# Patient Record
Sex: FEMALE | Race: WHITE | NOT HISPANIC OR LATINO | Employment: FULL TIME | URBAN - METROPOLITAN AREA
[De-identification: names, ages, dates, MRNs, and addresses within clinical notes are randomized per-mention and may not be internally consistent; named-entity substitution may affect disease eponyms.]

---

## 2017-03-28 ENCOUNTER — ALLSCRIPTS OFFICE VISIT (OUTPATIENT)
Dept: OTHER | Facility: OTHER | Age: 27
End: 2017-03-28

## 2017-03-28 DIAGNOSIS — S91.339A: ICD-10-CM

## 2017-08-15 ENCOUNTER — ALLSCRIPTS OFFICE VISIT (OUTPATIENT)
Dept: OTHER | Facility: OTHER | Age: 27
End: 2017-08-15

## 2017-10-02 ENCOUNTER — GENERIC CONVERSION - ENCOUNTER (OUTPATIENT)
Dept: OTHER | Facility: OTHER | Age: 27
End: 2017-10-02

## 2017-10-20 ENCOUNTER — GENERIC CONVERSION - ENCOUNTER (OUTPATIENT)
Dept: OTHER | Facility: OTHER | Age: 27
End: 2017-10-20

## 2017-10-23 NOTE — PROGRESS NOTES
Assessment  1  Encounter for gynecological examination without abnormal finding (V72 31) (Z01 419)    Plan  Encounter for gynecological examination without abnormal finding    · (1) THIN PREP PAP WITH IMAGING; Status: In Progress - Specimen/Data Collected;    Done: 90SVT1397   Perform:[facility]; Due:24Wkr3642; Ordered;For:Encounter for gynecological examination without abnormal finding; Ordered By:Moise Lennon; Maturation index required? : No  HPV? : if ASCUS  History of PCOS    · Alyacen 1/35 1-35 MG-MCG Oral Tablet; Take 1 tablet daily   Rx By: Daxa Alejandro; Dispense: 84 Days ; #:84 Tablet; Refill: 3;For: History of PCOS; PAWAN = N; Verified Transmission [facility]; Msg to Pharmacy: takes continuously skipping placebo week; Last Updated By: System, [employer]; 8/15/2017 11:53:07 AM    Discussion/Summary  health maintenance visit healthy adult female the risks and benefits of cervical cancer screening were discussed Pap test with reflex HPV testing was done today Breast cancer screening: the risks and benefits of breast cancer screening were discussed and monthly self breast exam was advised  Pt for annual GYN doing well on continuous pills  F/u 1 year, earlier as needed  Pt is currently in process of applying for optometry school  The patient has the current Goals: At goal  The patent has the current Barriers: None  Patient is able to Self-Care  Chief Complaint  Pt for annual GYN exam      History of Present Illness  HPI: Pt for annual GYN exam  Periods non existent takes pills continuously  Denies vaginal d/c or GYN complaints  w current partner x 6 5 years, declines STD work up  Feels much better emotionally after Nexplanon was removed  GYN HM, Adult Female [facility]: The patient is being seen for a health maintenance and gynecology evaluation  The last health maintenance visit was 1 year(s) ago  General Health:  The patient's health since the last visit is described as good  Lifestyle:  She does not use tobacco --Kelly denies alcohol use  --Kelly denies drug use  Reproductive health:  she uses contraception  For contraception, she uses oral contraception pills  --kelly is sexually active  --kelly denies prior pregnancies  Screening:      Review of Systems  oligomenorrhea, but--b0no pelvic pain,--b0no pelvic pressure,--b0no vaginal pain,--b0no vaginal discharge,--b0no vaginal itching,--b0no vaginal lump or mass,--b0no vaginal odor,--b0no nonmenstrual bleeding,--b0no dysmenorrhea,--b0no menorrhagia,--c5lmexjoo are regular,--p0cdfnuzy length of periods,--b0no dysuria,--b0no bladder pain,--l7ronfa not cloudyno urinary loss of control  Constitutional: no feverno chills  Cardiovascular: no chest painno palpitations  Breasts: no breast painno breast lump  Gastrointestinal: no abdominal painno constipation  Genitourinary: no dysuria  Active Problems  1  Acute sinusitis (461 9) (J01 90)   2  Blind spot scotoma, 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)   3  Contraceptive use (V25 40) (Z30 40)   4  Encounter for gynecological examination without abnormal finding (V72 31) (Z01 419)   5  Fibromyalgia (729 1) (M79 7)   6  History of PCOS (V13 29) (Z87 42)   7  Insertion of Nexplanon (V25 5) (Z30 017)   8  Low vitamin D level (268 9) (E55 9)   9  Need for Tdap vaccination (V06 1) (Z23)   10  Nexplanon removal (V25 43) (Z30 46)   11  Oligomenorrhea (626 1) (N91 5)   12  Palpitations (785 1) (R00 2)   13  Panic attacks (300 01) (F41 0)   14  Puncture wound of foot (892 0) (S91 339A)   15  Retinopathy (362 10) (H35 00)   16  Vitamin B12 deficiency (266 2) (E53 8)    Past Medical History   · History of ovarian cyst (V13 29) (Z87 42)   · History of Menarche (V21 8)   · History of Visit for routine gyn exam (V72 31) (Z01 419)    Surgical History   · History of Gastric Surgery For Morbid Obesity Gastric Bypass    The surgical history was reviewed and updated today         Family History  Mother    · Family history of diabetes mellitus (V18 0) (Z83 3)  Maternal Grandmother    · Family history of breast cancer in female (V16 3) (Z80 3)   · Family history of diabetes mellitus (V18 0) (Z83 3)   · Family history of lung cancer (V16 1) (Z80 1)  Paternal Grandmother    · Family history of hypertension (V17 49) (Z82 49)    The family history was reviewed and updated today  Social History   · College student   · Cultural background   · NON-   · Never a smoker   · History of Never a smoker   · No alcohol use   · No drug use   · Oral contraceptives use   · Primary spoken language English   · Racial background   · WHITE   · Single  The social history was reviewed and updated today  The social history was reviewed and is unchanged  Current Meds   1  ALPRAZolam 0 5 MG Oral Tablet; take 1 tablet 3 times a day as needed for anxiety; Therapy: 42VKG2861 to (Evaluate:68Plp4762)  Requested for: 11Aug2017; Last   Rx:11Aug2017 Ordered   2  Alyacen 1/35 1-35 MG-MCG Oral Tablet; Take 1 tablet daily; Therapy: 68LRS4411 to (Chapincito Plok)  Requested for: 10Aug2017; Last   Rx:19Roi1425 Ordered   3  Amitriptyline HCl - 50 MG Oral Tablet; TAKE 1 TABLET AT BEDTIME  Requested for:   09Nwl7679; Last Rx:54Qdr2092 Ordered   4  Atenolol 50 MG Oral Tablet; take 1 tablet by mouth every day; Therapy: 07Nmh1233 to [phone])  Requested for: 01XYV5057; Last   Rx:28Jun2017 Ordered   5  Cyanocobalamin 1000 MCG/ML Injection Solution; INJECT 1 ML INTRAMUSCULARLY   ONCE A MONTH;   Therapy: 01BON0829 to (Evaluate:92Yei5541)  Requested for: 81Rmp0028; Last   Rx:38Goy4182 Ordered   6  TraMADol HCl - 50 MG Oral Tablet; Therapy: (Recorded:68Pfa4695) to Recorded   7  Vitamin D3 2000 UNIT Oral Capsule; take 1 capsule daily; Therapy: 70Wrg8357 to (Last Rx:19Apr2016) Ordered    Allergies  1  Darvocet A500 TABS   2   Sulfa Drugs    Vitals   Recorded: 87LIF9314 75:11XA   Systolic 755   Diastolic 80 Height 5 ft 9 in   Weight 160 lb    BMI Calculated 23 63   BSA Calculated 1 88     Physical Exam    Constitutional   General appearance: No acute distress, well appearing and well nourished  Neck   Neck: Normal, supple, trachea midline, no masses  Thyroid: Normal, no thyromegaly  Pulmonary   Respiratory effort: No increased work of breathing or signs of respiratory distress  Auscultation of lungs: Clear to auscultation  Cardiovascular   Auscultation of heart: Normal rate and rhythm, normal S1 and S2, no murmurs  Peripheral vascular exam: Normal pulses Throughout  Genitourinary   External genitalia: Normal and no lesions appreciated  Vagina: Normal, no lesions or dryness appreciated  Urethra: Normal     Urethral meatus: Normal     Bladder: Normal, soft, non-tender and no prolapse or masses appreciated  Cervix: Normal, no palpable masses  Uterus: Normal, non-tender, not enlarged, and no palpable masses  Adnexa/parametria: Normal, non-tender and no fullness or masses appreciated  Anus, perineum, and rectum: Normal sphincter tone, no masses, and no prolapse  Chest   Breasts: Normal and no dimpling or skin changes noted  Abdomen   Abdomen: Normal, non-tender, and no organomegaly noted  Liver and spleen: No hepatomegaly or splenomegaly  Examination for hernias: No hernias appreciated  Lymphatic   Palpation of lymph nodes in neck, axillae, groin and/or other locations: No lymphadenopathy or masses noted  Skin   Skin and subcutaneous tissue: Normal skin turgor and no rashes  Palpation of skin and subcutaneous tissue: Normal     Psychiatric   Orientation to person, place, and time: Normal     Mood and affect: Normal        Attending Note  Collaborating Physician Note: Collaborating Note: I vised the Advanced PractitionerI agree with the Advanced Practitioner note   I discussed the case with the Advanced Practitioner and reviewed the AP note      Signatures Electronically signed by : LEONARDA Bowman;  Aug 15 2017 12:03PM EST                       (Author)    Electronically signed by : PADMINI Tellez ; Aug 21 2017  1:46PM EST                       (Author)

## 2018-01-10 NOTE — PROGRESS NOTES
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)   3  Contraceptive use (V25 40) (Z30 40)   4  Encounter for gynecological examination without abnormal finding (V72 31) (Z01 419)   5  Fibromyalgia (729 1) (M79 7)   6  History of PCOS (V13 29) (Z87 42)   7  Low vitamin D level (268 9) (E55 9)   8  Oligomenorrhea (626 1) (N91 5)   9  Palpitations (785 1) (R00 2)   10  Panic attacks (300 01) (F41 0)   11  Retinopathy (362 10) (H35 00)   12  Vitamin B12 deficiency (266 2) (E53 8)    Current Meds   1  ALPRAZolam 0 25 MG Oral Tablet; 1-2 tabs every 4-6hours PRN : anxiety; Therapy: 16XXF2674 to (Last Rx:28Eto0503) Ordered   2  Amitriptyline HCl - 25 MG Oral Tablet; TAKE 1 TABLET DAILY AT BEDTIME; Therapy: (Recorded:05Kud7805) to Recorded   3  Atenolol 25 MG Oral Tablet; Take 1 tablet daily; Therapy: 35OMR7571 to (Evaluate:57Xwv3471); Last Rx:81Wpo3692 Ordered   4  Necon 1/35 (28) 1-35 MG-MCG Oral Tablet; Take 1 tablet daily; Therapy: 93IPY9843 to (Evaluate:54Bdw4705)  Requested for: 44AYD0405; Last   Rx:95Laf6020 Ordered   5  TraMADol HCl - 50 MG Oral Tablet; Therapy: (Recorded:26Aef7049) to Recorded    Allergies    1  Darvocet A500 TABS   2  Sulfa Drugs    Plan  Vitamin B12 deficiency    · Cyanocobalamin 1000 MCG/ML Injection Solution    Future Appointments    Date/Time Provider Specialty Site   04/19/2016 11:00 AM PADMINI Norton   Family Medicine [address] [place] [address]   03/08/2016 01:00 PM [address] [place] [address], Nurse Schedule  [facility]   03/22/2016 01:00 PM [address] [place] [address], Nurse Schedule  [address] [place] [address]   04/05/2016 01:00 PM [address] [place] [address], Nurse Schedule  [address] [place] [address]   05/03/2016 01:00 PM [address], Nurse Schedule  [facility]   05/17/2016 01:00 PM [facility] [facility], Nurse Schedule  [facility]     Signatures   Electronically signed by :  PADMINI Villalobos ; Mar  1 2016  4:06PM EST                       (Author)

## 2018-01-12 NOTE — RESULT NOTES
Message   Please call patient  Her TSH/thyroid function is normal     Verified Results  (LC) TSH Rfx on Abnormal to Free T4 57HHM0124 11:40AM Sukhwinder Garibay     Test Name Result Flag Reference   TSH 1 040 uIU/mL  0 450-4 500       Plan  Vitamin B12 deficiency    · Cyanocobalamin 1000 MCG/ML Injection Solution    Signatures   Electronically signed by :  PADMINI Babb ; Mar  1 2016  4:08PM EST                       (Author)

## 2018-01-13 VITALS
WEIGHT: 160 LBS | SYSTOLIC BLOOD PRESSURE: 116 MMHG | DIASTOLIC BLOOD PRESSURE: 80 MMHG | HEIGHT: 69 IN | BODY MASS INDEX: 23.7 KG/M2

## 2018-01-13 NOTE — PROGRESS NOTES
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)   3  Contraceptive use (V25 40) (Z30 40)   4  Encounter for gynecological examination without abnormal finding (V72 31) (Z01 419)   5  Fibromyalgia (729 1) (M79 7)   6  History of PCOS (V13 29) (Z87 42)   7  Low vitamin D level (268 9) (E55 9)   8  Oligomenorrhea (626 1) (N91 5)   9  Palpitations (785 1) (R00 2)   10  Panic attacks (300 01) (F41 0)   11  Retinopathy (362 10) (H35 00)   12  Vitamin B12 deficiency (266 2) (E53 8)    Current Meds   1  ALPRAZolam 0 25 MG Oral Tablet; 1-2 tabs every 4-6hours PRN : anxiety; Therapy: 86OKW9373 to (Last Rx:60Vji3588) Ordered   2  Amitriptyline HCl - 25 MG Oral Tablet; TAKE 1 TABLET DAILY AT BEDTIME; Therapy: (Recorded:00Cah6587) to Recorded   3  Atenolol 25 MG Oral Tablet; Take 1 tablet daily; Therapy: 74KMM6291 to (Evaluate:07Egd9657); Last Rx:35Lqm9713 Ordered   4  Necon 1/35 (28) 1-35 MG-MCG Oral Tablet; Take 1 tablet daily; Therapy: 25NTG6016 to (Evaluate:93Dqx8299)  Requested for: 08NLY6830; Last   Rx:96Vch1106 Ordered   5  TraMADol HCl - 50 MG Oral Tablet; Therapy: (Recorded:41Xdz9548) to Recorded    Allergies    1  Darvocet A500 TABS   2  Sulfa Drugs    Plan  Vitamin B12 deficiency    · Cyanocobalamin 1000 MCG/ML Injection Solution    Future Appointments    Date/Time Provider Specialty Site   04/19/2016 11:00 AM PADMINI Penn  Family Medicine [address] [place] [address]   04/05/2016 01:00 PM [address] [place] [address], Nurse Schedule  [address] [place] [address]   05/03/2016 01:00 PM [address] [place] [address], Nurse Schedule  [facility]   05/17/2016 01:00 PM [address] [place] [address], Nurse Schedule  [facility]     Signatures   Electronically signed by :  PADMINI Bermudez ; Mar 22 2016  8:03PM EST (Author)

## 2018-01-14 VITALS
HEIGHT: 69 IN | TEMPERATURE: 97.5 F | SYSTOLIC BLOOD PRESSURE: 122 MMHG | BODY MASS INDEX: 22.96 KG/M2 | HEART RATE: 82 BPM | DIASTOLIC BLOOD PRESSURE: 84 MMHG | RESPIRATION RATE: 14 BRPM | WEIGHT: 155 LBS

## 2018-01-14 NOTE — PROGRESS NOTES
Assessment    1  Puncture wound of foot (892 0) (N24 062C)    Plan  Need for Tdap vaccination    · Adacel 5-2-15 5 LF-MCG/0 5 Intramuscular Suspension  Puncture wound of foot    · Cephalexin 500 MG Oral Capsule; TAKE 1 CAPSULE 3 TIMES DAILY   · * XR FOOT 3+ VIEW LEFT; Status:Active; Requested for:28Mar2017;     Discussion/Summary    Puncture wound left foot  Patient is certain that there is no foreign body, nevertheless we will proceed with x-ray for further evaluation  We will treat with Keflex for 7 days  Advised elevation, patient will use Aleve 2 tablets daily for the next few days along with Nexium for GI protection  Adacel administered today    The patient was counseled regarding instructions for management, impressions  Possible side effects of new medications were reviewed with the patient/guardian today  The treatment plan was reviewed with the patient/guardian  The patient/guardian understands and agrees with the treatment plan      Chief Complaint  pt cut left foot on sheet metal two days ago, cleaned it w peroxide and water,      History of Present Illness  HPI: Patient presents for evaluation of pain in her left foot  stepped on sharp metal piece on March 25, while cleaning her basement  Puncture wound left foot /ball , no splinters  Patient stepped on fairly big piece of metal which was intact   no recent Td  Pain with ambulation, limping      Review of Systems    Constitutional: No fever, no chills, feels well, no tiredness, no recent weight gain or loss  Musculoskeletal: limb pain, foot problems and foot pain, but as noted in HPI  Neurological: no complaints of headache, no confusion, no numbness or tingling, no dizziness or fainting  Active Problems    1  Acute sinusitis (461 9) (J01 90)   2  Blind spot scotoma, 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)   3  Contraceptive use (V25 40) (Z30 40)   4  Encounter for gynecological examination without abnormal finding (V72 31) (Z01 419)   5   Fibromyalgia (729 1) (M79 7)   6  History of PCOS (V13 29) (Z87 42)   7  Insertion of Nexplanon (V25 5) (Z30 017)   8  Low vitamin D level (268 9) (E55 9)   9  Nexplanon removal (V25 43) (Z30 46)   10  Oligomenorrhea (626 1) (N91 5)   11  Palpitations (785 1) (R00 2)   12  Panic attacks (300 01) (F41 0)   13  Retinopathy (362 10) (H35 00)   14  Vitamin B12 deficiency (266 2) (E53 8)    Past Medical History    1  History of ovarian cyst (V13 29) (Z87 42)   2  History of Menarche (V21 8)   3  History of Visit for routine gyn exam (V72 31) (Z01 419)  Active Problems And Past Medical History Reviewed: The active problems and past medical history were reviewed and updated today  Family History  Mother    1  Family history of diabetes mellitus (V18 0) (Z83 3)  Maternal Grandmother    2  Family history of breast cancer in female (V16 3) (Z80 3)   3  Family history of diabetes mellitus (V18 0) (Z83 3)   4  Family history of lung cancer (V16 1) (Z80 1)  Paternal Grandmother    11  Family history of hypertension (V17 49) (Z82 49)  Family History Reviewed: The family history was reviewed and updated today  Social History    · College student   · Cultural background   · NON-   · Never a smoker   · History of Never a smoker   · No alcohol use   · No drug use   · Oral contraceptives use   · Primary spoken language English   · Racial background   · WHITE   · Single  The social history was reviewed and updated today  Surgical History    1  History of Gastric Surgery For Morbid Obesity Gastric Bypass  Surgical History Reviewed: The surgical history was reviewed and updated today  Current Meds   1  ALPRAZolam 0 5 MG Oral Tablet; take 1 tablet 3 times a day as needed for anxiety; Therapy: 33UQH3260 to (Evaluate:26Mar2017)  Requested for: 48OKL5309; Last   Rx:06Mar2017 Ordered   2  Alyacen 1/35 1-35 MG-MCG Oral Tablet; Take 1 tablet daily;    Therapy: 17OSD4085 to (Evaluate:10Oct2017)  Requested for: 38SMR4112; Last   Rx:08Nov2016 Ordered   3  Amitriptyline HCl - 50 MG Oral Tablet; TAKE 1 TABLET AT BEDTIME  Requested for:   83Nta4202; Last Rx:77Iey5126 Ordered   4  Atenolol 50 MG Oral Tablet; take 1 tablet by mouth every day; Therapy: 19Apr2016 to (Evaluate:15Jun2017)  Requested for: 73IRA8128; Last   Rx:02Qfq0502 Ordered   5  Cyanocobalamin 1000 MCG/ML Injection Solution; INJECT 1 ML INTRAMUSCULARLY   ONCE A MONTH;   Therapy: 02EHY3353 to (Evaluate:18Dfr5837)  Requested for: 91Kju9600; Last   Rx:89Yws1385 Ordered   6  Gabapentin 300 MG Oral Capsule; TAKE 1 CAPSULE AT BEDTIME; Therapy: 10Hyi5985 to (Last Rx:79Dbr6672) Ordered   7  TraMADol HCl - 50 MG Oral Tablet; Therapy: (Recorded:61Gzh6096) to Recorded   8  Vitamin D3 2000 UNIT Oral Capsule; take 1 capsule daily; Therapy: 19Apr2016 to (Last Rx:19Apr2016) Ordered    The medication list was reviewed and updated today  Allergies    1  Darvocet A500 TABS   2  Sulfa Drugs    Vitals   Recorded: 28Mar2017 12:28PM   Temperature 97 5 F   Heart Rate 82   Respiration 14   Systolic 292   Diastolic 84   Height 5 ft 9 in   Weight 155 lb    BMI Calculated 22 89   BSA Calculated 1 85     Physical Exam    Constitutional   General appearance: No acute distress, well appearing and well nourished  Neurologic   Cranial nerves: Cranial nerves 2-12 intact  Psychiatric   Orientation to person, place, and time: Normal     Additional Exam:  Left foot,ball : Small puncture wound, no foreign body seen, tenderness with palpation but no erythema, warmth or discharge  Mildly edematous second left toe, no generalized foot edema  Results/Data  PHQ-2 Adult Depression Screening 28Mar2017 12:38PM User, s     Test Name Result Flag Reference   PHQ-2 Adult Depression Score 0     Over the last two weeks, how often have you been bothered by any of the following problems?   Little interest or pleasure in doing things: Not at all - 0  Feeling down, depressed, or hopeless: Not at all - 0   PHQ-2 Adult Depression Screening Negative       [employer]Cal[employer] - Health Calculators 40LNV8998 12:37PM User, Ahs     Test Name Result Flag Reference   Tobacco Screening - Result Negative         Signatures   Electronically signed by :  PADMINI Bermudez ; Mar 29 2017 10:40PM EST                       (Author)

## 2018-01-15 NOTE — RESULT NOTES
Message   Please call patient  All her blood work was normal aside from decreased level of vitamin B-1 it is 60 with a cutoff of 66  Please make sure that patient is using vitamin B complex once a day over-the-counter  Level of vitamin B-12 have improved significantly  I believe we should continue injections every 3-4 weeks to maintain it  Verified Results  (1) VITAMIN B1, WHOLE BLOOD 63ZFZ3394 10:10AM Melva Castro courtesy copy of this report has been sent to  the patient, [facility]  Test Name Result Flag Reference   Vit  B1, Whole Blood 60 7 nmol/L L 66 5-200 0       Signatures   Electronically signed by :  Hudson Sacks, M D ; Jun 19 2016  9:27PM EST                       (Author)

## 2018-01-16 NOTE — PROGRESS NOTES
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)   3  Contraceptive use (V25 40) (Z30 40)   4  Encounter for gynecological examination without abnormal finding (V72 31) (Z01 419)   5  Fibromyalgia (729 1) (M79 7)   6  History of PCOS (V13 29) (Z87 42)   7  Low vitamin D level (268 9) (E55 9)   8  Oligomenorrhea (626 1) (N91 5)   9  Palpitations (785 1) (R00 2)   10  Panic attacks (300 01) (F41 0)   11  Retinopathy (362 10) (H35 00)   12  Vitamin B12 deficiency (266 2) (E53 8)    Current Meds   1  ALPRAZolam 0 25 MG Oral Tablet; 1-2 tabs every 4-6hours PRN : anxiety; Therapy: 10FMK0735 to (Last Rx:67Bli2947) Ordered   2  Amitriptyline HCl - 25 MG Oral Tablet; TAKE 1 TABLET DAILY AT BEDTIME; Therapy: (Recorded:26Yyu5460) to Recorded   3  Atenolol 25 MG Oral Tablet; Take 1 tablet daily; Therapy: 73DDS4658 to (Evaluate:97Jnz6242); Last Rx:89Ysm7636 Ordered   4  Necon 1/35 (28) 1-35 MG-MCG Oral Tablet; Take 1 tablet daily; Therapy: 65OEP9744 to (Evaluate:87Pjp6342)  Requested for: 00QKQ2093; Last   Rx:80Jff4198 Ordered   5  TraMADol HCl - 50 MG Oral Tablet; Therapy: (Recorded:49Pvb8069) to Recorded    Allergies    1  Darvocet A500 TABS   2  Sulfa Drugs    Plan  Vitamin B12 deficiency    · Cyanocobalamin 1000 MCG/ML Injection Solution    Future Appointments    Date/Time Provider Specialty Site   04/19/2016 11:00 AM PADMINI Pepper   Family Medicine [address] [place] [address]   03/01/2016 01:30 PM [address] [place] [address] [place] [facility], Nurse Schedule  [address] [place] [address]   03/08/2016 01:00 PM [address] [place] [address], Nurse Schedule  [address] [place] [address]   03/22/2016 01:00 PM [address], Nurse Schedule  [facility]   04/05/2016 01:00 PM [address], Nurse Schedule  [facility]   05/03/2016 01:00 PM [address], Nurse Schedule  [facility]   05/17/2016 01:00 PM [facility] [facility], Nurse Schedule  [facility]     Signatures   Electronically signed by :  PADMINI Eden ; Feb 23 2016  3:39PM EST                       (Author)

## 2018-01-16 NOTE — PROGRESS NOTES
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)   3  Contraceptive use (V25 40) (Z30 40)   4  Encounter for gynecological examination without abnormal finding (V72 31) (Z01 419)   5  Fibromyalgia (729 1) (M79 7)   6  History of PCOS (V13 29) (Z87 42)   7  Low vitamin D level (268 9) (E55 9)   8  Oligomenorrhea (626 1) (N91 5)   9  Palpitations (785 1) (R00 2)   10  Panic attacks (300 01) (F41 0)   11  Retinopathy (362 10) (H35 00)   12  Vitamin B12 deficiency (266 2) (E53 8)    Current Meds   1  ALPRAZolam 0 25 MG Oral Tablet; 1-2 tabs every 4-6hours PRN : anxiety; Therapy: 73DZC9685 to (Last Rx:19Apr2016) Ordered   2  Amitriptyline HCl - 25 MG Oral Tablet; TAKE 1 TABLET DAILY AT BEDTIME; Therapy: (Recorded:82Vda8955) to Recorded   3  Atenolol 25 MG Oral Tablet; take 1 tablet every day; Therapy: 10MGU2414 to (Evaluate:11Aug2016)  Requested for: 10AOM9389; Last   Rx:37Oeb2600 Ordered   4  Atenolol 50 MG Oral Tablet; TAKE 1 TABLET BY MOUTH ONCE DAILY; Therapy: 69Mna2991 to (Evaluate:35Dlo7347)  Requested for: 98Szs1967; Last   Rx:77Pyc2520 Ordered   5  Necon 1/35 (28) 1-35 MG-MCG Oral Tablet; Take 1 tablet daily; Therapy: 88MFO8433 to (Evaluate:60Hvm3266)  Requested for: 33LMW6440; Last   Rx:47Snx2172 Ordered   6  TraMADol HCl - 50 MG Oral Tablet; Therapy: (Recorded:73Wjg3067) to Recorded   7  Vitamin D3 2000 UNIT Oral Capsule; take 1 capsule daily; Therapy: 02Zrq4272 to (Last Rx:45Ktr7098) Ordered    Allergies    1  Darvocet A500 TABS   2  Sulfa Drugs    Plan  Vitamin B12 deficiency    · Cyanocobalamin 1000 MCG/ML Injection Solution    Future Appointments    Date/Time Provider Specialty Site   08/16/2016 11:00 AM Leigh Curling, M D [place] [address]   07/12/2016 01:00 PM [address], Nurse Schedule  [address] [facility] Signatures   Electronically signed by :  PADMINI Hyde ; Jun 7 2016  8:03PM EST                       (Author)

## 2018-01-17 NOTE — PROGRESS NOTES
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)   3  Contraceptive use (V25 40) (Z30 40)   4  Encounter for gynecological examination without abnormal finding (V72 31) (Z01 419)   5  Fibromyalgia (729 1) (M79 7)   6  History of PCOS (V13 29) (Z87 42)   7  Low vitamin D level (268 9) (E55 9)   8  Oligomenorrhea (626 1) (N91 5)   9  Palpitations (785 1) (R00 2)   10  Panic attacks (300 01) (F41 0)   11  Retinopathy (362 10) (H35 00)   12  Vitamin B12 deficiency (266 2) (E53 8)    Current Meds   1  ALPRAZolam 0 25 MG Oral Tablet; 1-2 tabs every 4-6hours PRN : anxiety; Therapy: 80SKB0403 to (Last Rx:58Lqn6452) Ordered   2  Amitriptyline HCl - 25 MG Oral Tablet; TAKE 1 TABLET DAILY AT BEDTIME; Therapy: (Recorded:85Ncn1900) to Recorded   3  Atenolol 25 MG Oral Tablet; Take 1 tablet daily; Therapy: 30NTI1009 to (Evaluate:82Hdx5011); Last Rx:16Vij2398 Ordered   4  Necon 1/35 (28) 1-35 MG-MCG Oral Tablet; Take 1 tablet daily; Therapy: 66WHL2639 to (Evaluate:20Plx2862)  Requested for: 19MSP7985; Last   Rx:18Mxx6465 Ordered   5  TraMADol HCl - 50 MG Oral Tablet; Therapy: (Recorded:60Obs9407) to Recorded    Allergies    1  Darvocet A500 TABS   2  Sulfa Drugs    Plan  Vitamin B12 deficiency    · Cyanocobalamin 1000 MCG/ML Injection Solution    Future Appointments    Date/Time Provider Specialty Site   04/19/2016 11:00 AM PADMINI Santizo   Family Medicine [address] [place] [address] [facility] [place]   03/22/2016 01:00 PM [address] [place] [address] [place], Nurse Schedule  [address] [facility] [place]   04/05/2016 01:00 PM [address] [place] [address] [place], Nurse Schedule  [address] [facility]   05/03/2016 01:00 PM [address], Nurse Schedule  [address] [facility]   05/17/2016 01:00 PM [address], Nurse Schedule  [address] [facility] Signatures   Electronically signed by :  PADMINI Bermudez ; Mar  8 2016  3:41PM EST                       (Author)

## 2018-01-18 NOTE — PROGRESS NOTES
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)   3  Contraceptive use (V25 40) (Z30 40)   4  Encounter for gynecological examination without abnormal finding (V72 31) (Z01 419)   5  Fibromyalgia (729 1) (M79 7)   6  History of PCOS (V13 29) (Z87 42)   7  Low vitamin D level (268 9) (E55 9)   8  Oligomenorrhea (626 1) (N91 5)   9  Palpitations (785 1) (R00 2)   10  Panic attacks (300 01) (F41 0)   11  Retinopathy (362 10) (H35 00)   12  Vitamin B12 deficiency (266 2) (E53 8)    Current Meds   1  ALPRAZolam 0 25 MG Oral Tablet; 1-2 tabs every 4-6hours PRN : anxiety; Therapy: 28IUB9456 to (Last Rx:35Obp5605) Ordered   2  Amitriptyline HCl - 25 MG Oral Tablet; TAKE 1 TABLET DAILY AT BEDTIME; Therapy: (Recorded:36Iui2034) to Recorded   3  Atenolol 50 MG Oral Tablet; TAKE 1 TABLET BY MOUTH ONCE DAILY; Therapy: 99Bak4854 to (Evaluate:83Ltw8676)  Requested for: 55Xdc0258; Last   Rx:49Wiq9876 Ordered   4  Necon 1/35 (28) 1-35 MG-MCG Oral Tablet; Take 1 tablet daily; Therapy: 60OHM0603 to (Evaluate:70Opm1400)  Requested for: 60UQR1447; Last   Rx:66Luv0411 Ordered   5  TraMADol HCl - 50 MG Oral Tablet; Therapy: (Recorded:39Lsn0014) to Recorded   6  Vitamin D3 2000 UNIT Oral Capsule; take 1 capsule daily; Therapy: 07Rhi5128 to (Last Rx:55Ovn9811) Ordered    Allergies    1  Darvocet A500 TABS   2  Sulfa Drugs    Plan  Vitamin B12 deficiency    · Cyanocobalamin 1000 MCG/ML Injection Solution    Future Appointments    Date/Time Provider Specialty Site   08/16/2016 11:00 AM PADMINI Pitt  Family Medicine [address] [place] [address]   06/07/2016 03:00 PM [address] [facility], Nurse Schedule  [address] [facility]   07/12/2016 01:00 PM [address] [facility], Nurse Schedule  [facility]     Signatures   Electronically signed by :  Arden Dangelo PADMINI Smith ; May  3 2016  8:12PM EST                       (Author)

## 2018-01-22 VITALS
SYSTOLIC BLOOD PRESSURE: 118 MMHG | RESPIRATION RATE: 16 BRPM | BODY MASS INDEX: 24.33 KG/M2 | DIASTOLIC BLOOD PRESSURE: 74 MMHG | TEMPERATURE: 96.1 F | HEIGHT: 69 IN | WEIGHT: 164.25 LBS | HEART RATE: 68 BPM

## 2018-01-26 DIAGNOSIS — F41.1 GAD (GENERALIZED ANXIETY DISORDER): Primary | ICD-10-CM

## 2018-01-26 RX ORDER — ALPRAZOLAM 0.5 MG/1
TABLET ORAL
Qty: 60 TABLET | Refills: 0 | Status: SHIPPED | OUTPATIENT
Start: 2018-01-26 | End: 2018-06-26 | Stop reason: SDUPTHER

## 2018-01-26 NOTE — TELEPHONE ENCOUNTER
Please contact patient  I am refilling her alprazolam but I have not seen her in the office since March of 2017    Please advise her to schedule checkup/physical   Thank you

## 2018-03-27 DIAGNOSIS — Z30.41 ENCOUNTER FOR SURVEILLANCE OF CONTRACEPTIVE PILLS: Primary | ICD-10-CM

## 2018-04-17 DIAGNOSIS — Z30.41 ENCOUNTER FOR SURVEILLANCE OF CONTRACEPTIVE PILLS: ICD-10-CM

## 2018-05-10 ENCOUNTER — TELEPHONE (OUTPATIENT)
Dept: OBGYN CLINIC | Facility: CLINIC | Age: 28
End: 2018-05-10

## 2018-05-10 DIAGNOSIS — Z30.41 ENCOUNTER FOR SURVEILLANCE OF CONTRACEPTIVE PILLS: ICD-10-CM

## 2018-05-10 NOTE — TELEPHONE ENCOUNTER
rx sent through to \[facility]\"" [facility] to sign off

## 2018-06-25 RX ORDER — AMITRIPTYLINE HYDROCHLORIDE 50 MG/1
1 TABLET, FILM COATED ORAL
COMMUNITY

## 2018-06-25 RX ORDER — ACETAMINOPHEN 160 MG
1 TABLET,DISINTEGRATING ORAL DAILY
COMMUNITY
Start: 2016-04-19

## 2018-06-25 RX ORDER — TRAMADOL HYDROCHLORIDE 50 MG/1
50 TABLET ORAL 4 TIMES DAILY
Refills: 3 | COMMUNITY
Start: 2018-05-29

## 2018-06-25 RX ORDER — CYANOCOBALAMIN 1000 UG/ML
1 INJECTION INTRAMUSCULAR; SUBCUTANEOUS
COMMUNITY
Start: 2016-08-27 | End: 2018-09-09 | Stop reason: SDUPTHER

## 2018-06-25 RX ORDER — ATENOLOL 50 MG/1
50 TABLET ORAL DAILY
COMMUNITY
Start: 2016-04-19 | End: 2018-08-13

## 2018-06-26 ENCOUNTER — OFFICE VISIT (OUTPATIENT)
Dept: FAMILY MEDICINE CLINIC | Facility: CLINIC | Age: 28
End: 2018-06-26
Payer: COMMERCIAL

## 2018-06-26 VITALS
SYSTOLIC BLOOD PRESSURE: 118 MMHG | WEIGHT: 160.4 LBS | HEART RATE: 80 BPM | DIASTOLIC BLOOD PRESSURE: 80 MMHG | BODY MASS INDEX: 23.76 KG/M2 | TEMPERATURE: 96.9 F | RESPIRATION RATE: 16 BRPM | HEIGHT: 69 IN

## 2018-06-26 DIAGNOSIS — R42 LIGHTHEADEDNESS: Primary | ICD-10-CM

## 2018-06-26 DIAGNOSIS — Z98.84 HISTORY OF GASTRIC BYPASS: ICD-10-CM

## 2018-06-26 DIAGNOSIS — F41.1 GAD (GENERALIZED ANXIETY DISORDER): ICD-10-CM

## 2018-06-26 PROBLEM — J30.9 ALLERGIC RHINITIS: Status: ACTIVE | Noted: 2017-10-03

## 2018-06-26 PROCEDURE — 99214 OFFICE O/P EST MOD 30 MIN: CPT | Performed by: NURSE PRACTITIONER

## 2018-06-26 RX ORDER — ALPRAZOLAM 0.5 MG/1
0.5 TABLET ORAL 2 TIMES DAILY PRN
Qty: 30 TABLET | Refills: 0 | Status: SHIPPED | OUTPATIENT
Start: 2018-06-26 | End: 2018-07-23 | Stop reason: SDUPTHER

## 2018-06-26 NOTE — PROGRESS NOTES
FAMILY PRACTICE OFFICE VISIT       NAME: Melita Atwood  AGE: 28 y o  SEX: female       : 1990        MRN: [id number]    DATE: 2018    Assessment and Plan     Problem List Items Addressed This Visit     None      Visit Diagnoses     Lightheadedness    -  Primary    VANITA (generalized anxiety disorder)        Relevant Medications    ALPRAZolam (XANAX) 0 5 mg tablet    History of gastric bypass        Relevant Orders    CBC and differential    Comprehensive metabolic panel    TSH, 3rd generation    Vitamin B12    Vitamin D 25 hydroxy            1  Lightheadedness  Patient presents today with feeling lightheaded when she gets up out of bed in the morning for the past 1-2 months  She would like to stop Atenolol as she feels this may be causing her symptoms  She has reduced her dose from 50 mg to 25 mg over the past 2 months  We will trial off atenolol  This was prescribed for panic attacks and palpitations in the past  She denies palpitations and does not feel it is helping with panic attacks  Suspect lightheadedness is orthostatic in origin  She will reduce atenolol dose to 1/4 tablet or 12 5 mg daily for 2 weeks then will stop  She will also work on increasing her water intake and decreasing her coffee intake  Recommend increasing fluid intake to 1 liter per day  She will work on this  If symptoms persist despite discontinuation of Atenolol and increasing water intake she will call  Follow up with Dr Jsoesito Vazquez in July or August     2  VANITA (generalized anxiety disorder)  Has tried Cymbalta in the past, and recently tried Zoloft, unable to tolerate side effects  Small supply of Xanax provided for anxiety related to work situations  She is not currently attending counseling and declines at this time  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  Is hoping this will alleviate a lot of her stress  She is aware of the addicting potential of Xanax   She is aware to use this medication sparingly, only when absolutely needed  She will follow up with Dr Evette Albrecht in July or August      ALPRAZolam Jaqujj [place]) 0 5 mg tablet   3  History of gastric bypass  Will check routine blood work and follow up with Dr Evette Albrecht  CBC and differential    Comprehensive metabolic panel    TSH, 3rd generation    Vitamin B12    Vitamin D 25 hydroxy         Chief Complaint     Chief Complaint   Patient presents with    Dizziness     Pt is here w/ c/o dizziness especially in the morning when she initially stands up in the morning  Pt states some shimmering effect in her vision that comes and goes  Pt states she has been cutting her BP med in 1/2  Pt also states that she has been checking her BP at home and it has been low        History of Present Illness     Patient presents today with feeling lightheaded when she stands gets up out of bed in the morning  States she feels like her vision gets dark and then comes back to normal  All of this lasts a few seconds and has been occurring for the past 1-2 months  She had her vision checked by an optometrist, with no abnormalities  She feels are symptoms may be related to Atenolol  She has been checking her BP at home and it running 100's/60's  She has been taking 1/2 tablet of Atenolol for 2 months  Denies palpitations  She is having a lot of work stress and anxiety, for which she requests refill of Alprazolam  She has used this in the past and it has been effective  Last use of Alprazolam was February  She is having mild panic attacks at work with feeling stressed and a little short of breath  She is not currently attending counseling  She has 44 more days until her last day of work, when she will begin [employer] school  She is looking forward to this, and anticipating a different kind of stress, but will be happy to be out of her work environment  States she tried Zoloft recently prescribed by her rheumatologist, Dr Angelito Jorge   She was having flares of fibromyalgia due to stress at work  She could not tolerate Zoloft, it made her feel flat and gain weight  She has tried Cymbalta in the past as well, which she could not tolerate  She is currently taking Tramadol 50 mg 3-4 times per day, for pain, which is effective  She has recently weaned the dose and would like to wean off of Tramadol  She does exercise via walking regularly  She admits she does not drink enough water, about 34 ounces per day, plus coffee  Significant history of bariatric surgery  Review of Systems   Review of Systems   Constitutional: Positive for chills  Negative for activity change, fatigue and fever  HENT: Negative  Respiratory: Negative for cough, chest tightness, shortness of breath and wheezing  Cardiovascular: Negative for chest pain, palpitations and leg swelling  Gastrointestinal: Negative for diarrhea, nausea and vomiting  Neurological: Positive for light-headedness  Negative for dizziness, weakness and headaches  Psychiatric/Behavioral: Negative for sleep disturbance and suicidal ideas  The patient is nervous/anxious          Active Problem List     Patient Active Problem List   Diagnosis    Fibromyalgia    Allergic rhinitis    Low vitamin D level    Panic attacks    Vitamin B12 deficiency       Past Medical History:  Past Medical History:   Diagnosis Date    Ovarian cyst        Past Surgical History:  Past Surgical History:   Procedure Laterality Date    CHINTAN-EN-Y PROCEDURE  2009       Family History:  Family History   Problem Relation Age of Onset    Diabetes Mother     Breast cancer Maternal Grandmother          mid 63's    Diabetes Maternal Grandmother     Lung cancer Maternal Grandmother     Hypertension Paternal Grandmother        Social History:  Social History     Social History    Marital status: Single     Spouse name: N/A    Number of children: N/A    Years of education: N/A     Occupational History     [employer]     Social History Main Topics    Smoking status: Never Smoker    Smokeless tobacco: Never Used    Alcohol use No    Drug use: No    Sexual activity: Not on file     Other Topics Concern    Not on file     Social History Narrative    No narrative on file       I have reviewed the patient's medical history in detail; there are no changes to the history as noted in the electronic medical record  Objective     Vitals:    06/26/18 1135   BP: 118/80   BP Location: Left arm   Patient Position: Sitting   Cuff Size: Adult   Pulse: 80   Resp: 16   Temp: (!) 96 9 °F (36 1 °C)   TempSrc: Tympanic   Weight: 72 8 kg (160 lb 6 4 oz)   Height: 5' 9" (1 753 m)     Wt Readings from Last 3 Encounters:   06/26/18 72 8 kg (160 lb 6 4 oz)   10/02/17 74 5 kg (164 lb 4 oz)   08/15/17 72 6 kg (160 lb)     Body mass index is 23 69 kg/m²  Physical Exam   Constitutional: She appears well-developed and well-nourished  No distress  HENT:   Head: Normocephalic and atraumatic  Right Ear: Tympanic membrane and ear canal normal    Left Ear: Tympanic membrane and ear canal normal    Nose: Nose normal    Mouth/Throat: Oropharynx is clear and moist    Eyes: Conjunctivae and EOM are normal  Pupils are equal, round, and reactive to light  Neck: Normal range of motion  Neck supple  No thyromegaly present  Cardiovascular: Normal rate, regular rhythm and normal heart sounds  No murmur heard  Pulmonary/Chest: Effort normal and breath sounds normal    Musculoskeletal: She exhibits no edema  Lymphadenopathy:     She has no cervical adenopathy  Neurological: No cranial nerve deficit  Coordination normal    Negative Romberg   Psychiatric: She has a normal mood and affect  Nursing note and vitals reviewed        ALLERGIES:  Allergies   Allergen Reactions    Propoxyphene     Sulfa Antibiotics        Current Medications     Current Outpatient Prescriptions   Medication Sig Dispense Refill    ALPRAZolam (XANAX) 0 5 mg tablet Take 1 tablet (0 5 mg total) by mouth 2 (two) times a day as needed for anxiety 30 tablet 0    amitriptyline (ELAVIL) 50 mg tablet Take 1 tablet by mouth daily at bedtime      atenolol (TENORMIN) 50 mg tablet Take 50 mg by mouth daily        Cholecalciferol (VITAMIN D3) 2000 units capsule Take 1 capsule by mouth daily      cyanocobalamin 1,000 mcg/mL Inject 1 mL as directed every 30 (thirty) days      norethindrone-ethinyl estradiol (ALYACEN 1/35) 1-35 MG-MCG per tablet Take 1 tablet by mouth daily 28 tablet 3    traMADol (ULTRAM) 50 mg tablet Take 50 mg by mouth 4 (four) times a day    3     No current facility-administered medications for this visit            Health Maintenance     Health Maintenance   Topic Date Due    HIV SCREENING  1990    Depression Screening PHQ-9  1990    INFLUENZA VACCINE  09/01/2018    DTaP,Tdap,and Td Vaccines (2 - Td) 03/28/2027     Immunization History   Administered Date(s) Administered    Tdap 03/28/2017       JONATHAN Huang

## 2018-07-03 DIAGNOSIS — Z98.84 HISTORY OF GASTRIC BYPASS: Primary | ICD-10-CM

## 2018-07-23 DIAGNOSIS — F41.1 GAD (GENERALIZED ANXIETY DISORDER): ICD-10-CM

## 2018-07-24 RX ORDER — ALPRAZOLAM 0.5 MG/1
TABLET ORAL
Qty: 30 TABLET | Refills: 0 | Status: SHIPPED | OUTPATIENT
Start: 2018-07-24 | End: 2018-08-13 | Stop reason: SDUPTHER

## 2018-07-24 NOTE — TELEPHONE ENCOUNTER
Please let patient know, I refilled her xanax  She needs to schedule her follow up appointment with Dr Bryant Sauceda before her prescription can be refilled in the future

## 2018-08-13 ENCOUNTER — OFFICE VISIT (OUTPATIENT)
Dept: FAMILY MEDICINE CLINIC | Facility: CLINIC | Age: 28
End: 2018-08-13
Payer: COMMERCIAL

## 2018-08-13 VITALS
RESPIRATION RATE: 16 BRPM | TEMPERATURE: 96.4 F | SYSTOLIC BLOOD PRESSURE: 118 MMHG | DIASTOLIC BLOOD PRESSURE: 74 MMHG | WEIGHT: 163.4 LBS | BODY MASS INDEX: 24.2 KG/M2 | HEIGHT: 69 IN | HEART RATE: 78 BPM

## 2018-08-13 DIAGNOSIS — M79.7 FIBROMYALGIA: ICD-10-CM

## 2018-08-13 DIAGNOSIS — F41.1 GAD (GENERALIZED ANXIETY DISORDER): Primary | ICD-10-CM

## 2018-08-13 DIAGNOSIS — E53.8 VITAMIN B12 DEFICIENCY: ICD-10-CM

## 2018-08-13 DIAGNOSIS — R00.2 PALPITATIONS: ICD-10-CM

## 2018-08-13 PROCEDURE — 1036F TOBACCO NON-USER: CPT | Performed by: FAMILY MEDICINE

## 2018-08-13 PROCEDURE — 3008F BODY MASS INDEX DOCD: CPT | Performed by: FAMILY MEDICINE

## 2018-08-13 PROCEDURE — 99214 OFFICE O/P EST MOD 30 MIN: CPT | Performed by: FAMILY MEDICINE

## 2018-08-13 RX ORDER — ATENOLOL 25 MG/1
TABLET ORAL
Qty: 30 TABLET | Refills: 1 | Status: SHIPPED | OUTPATIENT
Start: 2018-08-13 | End: 2019-01-28 | Stop reason: SDUPTHER

## 2018-08-13 RX ORDER — ALPRAZOLAM 0.5 MG/1
0.5 TABLET ORAL 2 TIMES DAILY PRN
Qty: 30 TABLET | Refills: 0 | Status: SHIPPED | OUTPATIENT
Start: 2018-08-13 | End: 2018-09-27 | Stop reason: SDUPTHER

## 2018-08-13 NOTE — ASSESSMENT & PLAN NOTE
Patient is followed closely by Rheumatology and this is overall stable  Continue with amitriptyline, tramadol as needed

## 2018-08-13 NOTE — ASSESSMENT & PLAN NOTE
Patient will continue with her vitamin B12 injections, has a history of gastric bypass  Patient will get blood work done

## 2018-08-13 NOTE — PROGRESS NOTES
FAMILY PRACTICE OFFICE VISIT       NAME: Rommel Toure  AGE: 28 y o  SEX: female       : 1990        MRN: [id number]    DATE: 2018  TIME: 1:09 PM    Assessment and Plan     Problem List Items Addressed This Visit     Fibromyalgia       Patient is followed closely by Rheumatology and this is overall stable  Continue with amitriptyline, tramadol as needed  Vitamin B12 deficiency      Patient will continue with her vitamin B12 injections, has a history of gastric bypass  Patient will get blood work done  VANITA (generalized anxiety disorder) - Primary       Patient has a history of generalized anxiety, social anxiety and takes Xanax as needed  She has tried SSRI as well as Cymbalta which cause negative side effects  Patient is requesting another refill for her Xanax that she is starting Optometry school tomorrow and is experiencing anxiety regarding this  She is aware of the addictive potential and states she will only take it as needed  I have also advised on scheduling an appointment with our behavioral health specialist as I feel she would benefit from this  She will consider this  Relevant Medications    ALPRAZolam (XANAX) 0 5 mg tablet    Palpitations       Patient was started on atenolol for history of palpitations however was experiencing dizziness, lightheadedness which has resolved after being weaned off of the atenolol  After discontinuing the atenolol, patient has noted occasional palpitations  I will go ahead and start patient on a quarter tab of 25 mg daily  Patient will also get blood work done  Relevant Medications    atenolol (TENORMIN) 25 mg tablet            There are no Patient Instructions on file for this visit  Chief Complaint     Chief Complaint   Patient presents with    Follow-up     Patient is here for a follow up visit  History of Present Illness     HPI   18-year-old female here for follow-up    Has been off atenolol, dizziness and lightheadedness have resolved  Pt states she was started on atenolol for palpitations  She did experience palpitations after discontinuing the atenolol however they have not been as bad  She is wondering if she can take a small dose every other day  Patient has been experiencing anxiety and she is starting optometrist call tomorrow  She has also been experiencing social anxiety  She is requesting a refill for Xanax however states she only takes it as needed  Was previously on cymbalta and zoloft,had negative side effcets  Patient states she also sees a rheumatologist for fibromyalgia and is slowly trying to decrease her tramadol  She also takes amitriptyline  Review of Systems   Review of Systems   Constitutional: Negative for unexpected weight change  HENT: Negative  Respiratory: Negative for shortness of breath  Cardiovascular: Negative  Neurological: Negative for dizziness and light-headedness  Psychiatric/Behavioral: Negative for dysphoric mood  The patient is nervous/anxious          Active Problem List     Patient Active Problem List   Diagnosis    Fibromyalgia    Allergic rhinitis    Low vitamin D level    Panic attacks    Vitamin B12 deficiency    VANITA (generalized anxiety disorder)    Palpitations       Past Medical History:  Past Medical History:   Diagnosis Date    Ovarian cyst        Past Surgical History:  Past Surgical History:   Procedure Laterality Date    CHINTAN-EN-Y PROCEDURE  2009       Family History:  Family History   Problem Relation Age of Onset    Diabetes Mother     Breast cancer Maternal Grandmother          mid 63's    Diabetes Maternal Grandmother     Lung cancer Maternal Grandmother     Hypertension Paternal Grandmother        Social History:  Social History     Social History    Marital status: Single     Spouse name: N/A    Number of children: N/A    Years of education: N/A     Occupational History   [employer] [address] [employer] [address] [employer] Social History Main Topics    Smoking status: Never Smoker    Smokeless tobacco: Never Used    Alcohol use Yes    Drug use: No    Sexual activity: Not on file     Other Topics Concern    Not on file     Social History Narrative    No narrative on file     I have reviewed the patient's medical history in detail; there are no changes to the history as noted in the electronic medical record  Objective     Vitals:    08/13/18 0921   BP: 118/74   Pulse: 78   Resp: 16   Temp: (!) 96 4 °F (35 8 °C)     Wt Readings from Last 3 Encounters:   08/13/18 74 1 kg (163 lb 6 4 oz)   06/26/18 72 8 kg (160 lb 6 4 oz)   10/02/17 74 5 kg (164 lb 4 oz)       Physical Exam   Constitutional: She is oriented to person, place, and time  She appears well-developed and well-nourished  HENT:   Head: Normocephalic and atraumatic  Mouth/Throat: Oropharynx is clear and moist      TMs intact and clear   Eyes: Conjunctivae and EOM are normal  Pupils are equal, round, and reactive to light  Neck: Normal range of motion  Neck supple  Cardiovascular: Normal rate, regular rhythm and normal heart sounds  Pulmonary/Chest: Effort normal and breath sounds normal    Lymphadenopathy:     She has no cervical adenopathy  Neurological: She is alert and oriented to person, place, and time  Psychiatric: She has a normal mood and affect  Nursing note and vitals reviewed        Pertinent Laboratory/Diagnostic Studies:  Lab Results   Component Value Date    GLUCOSE 86 02/28/2016    BUN 10 02/28/2016    CREATININE 0 74 02/28/2016    CALCIUM 9 1 02/28/2016     02/28/2016    K 4 7 02/28/2016    CO2 24 02/28/2016    CL 98 02/28/2016     Lab Results   Component Value Date    ALT 13 02/28/2016    AST 16 02/28/2016    ALKPHOS 83 02/28/2016    BILITOT 0 5 02/28/2016       Lab Results   Component Value Date    WBC 5 0 02/28/2016    HGB 12 0 02/28/2016    HCT 36 6 02/28/2016    MCV 88 02/28/2016     02/28/2016       No results found for: TSH    No results found for: CHOL  No results found for: TRIG  No results found for: HDL  No results found for: LDLCALC  No results found for: HGBA1C    Results for orders placed or performed in visit on 06/14/16   SEDIMENTATION RATE (HISTORICAL)   Result Value Ref Range    ERYTHROCYTE SEDIMENTATION RATE 16 0 - 32 mm/hr   VITAMIN B1, WHOLE BLOOD (HISTORICAL)   Result Value Ref Range    VITAMIN B1, WHOLE BLOOD 60 7 (L) 66 5 - 200 0 nmol/L       No orders of the defined types were placed in this encounter  ALLERGIES:  Allergies   Allergen Reactions    Propoxyphene     Sulfa Antibiotics        Current Medications     Current Outpatient Prescriptions   Medication Sig Dispense Refill    ALPRAZolam (XANAX) 0 5 mg tablet Take 1 tablet (0 5 mg total) by mouth 2 (two) times a day as needed for anxiety 30 tablet 0    amitriptyline (ELAVIL) 50 mg tablet Take 1 tablet by mouth daily at bedtime      Cholecalciferol (VITAMIN D3) 2000 units capsule Take 1 capsule by mouth daily      cyanocobalamin 1,000 mcg/mL Inject 1 mL as directed every 30 (thirty) days      norethindrone-ethinyl estradiol (ALYACEN 1/35) 1-35 MG-MCG per tablet Take 1 tablet by mouth daily 28 tablet 3    traMADol (ULTRAM) 50 mg tablet Take 50 mg by mouth 4 (four) times a day    3    atenolol (TENORMIN) 25 mg tablet Take 1/4 tablet daily 30 tablet 1     No current facility-administered medications for this visit            Health Maintenance     Health Maintenance   Topic Date Due    HIV SCREENING  1990    INFLUENZA VACCINE  09/01/2018    Depression Screening PHQ-9  08/13/2019    DTaP,Tdap,and Td Vaccines (2 - Td) 03/28/2027     Immunization History   Administered Date(s) Administered    Tdap 03/28/2017       Marcelina Morton MD

## 2018-08-13 NOTE — ASSESSMENT & PLAN NOTE
Patient has a history of generalized anxiety, social anxiety and takes Xanax as needed  She has tried SSRI as well as Cymbalta which cause negative side effects  Patient is requesting another refill for her Xanax that she is starting Optometry school tomorrow and is experiencing anxiety regarding this  She is aware of the addictive potential and states she will only take it as needed  I have also advised on scheduling an appointment with our behavioral health specialist as I feel she would benefit from this  She will consider this

## 2018-08-13 NOTE — ASSESSMENT & PLAN NOTE
Patient was started on atenolol for history of palpitations however was experiencing dizziness, lightheadedness which has resolved after being weaned off of the atenolol  After discontinuing the atenolol, patient has noted occasional palpitations  I will go ahead and start patient on a quarter tab of 25 mg daily  Patient will also get blood work done

## 2018-09-09 DIAGNOSIS — E53.8 VITAMIN B12 DEFICIENCY: Primary | ICD-10-CM

## 2018-09-10 RX ORDER — CYANOCOBALAMIN 1000 UG/ML
INJECTION INTRAMUSCULAR; SUBCUTANEOUS
Qty: 3 ML | Refills: 1 | Status: SHIPPED | OUTPATIENT
Start: 2018-09-10 | End: 2019-02-17 | Stop reason: SDUPTHER

## 2018-09-15 DIAGNOSIS — Z30.41 ENCOUNTER FOR SURVEILLANCE OF CONTRACEPTIVE PILLS: ICD-10-CM

## 2018-09-17 RX ORDER — NORETHINDRONE AND ETHINYL ESTRADIOL 1 MG-35MCG
KIT ORAL
Qty: 28 TABLET | Refills: 2 | Status: SHIPPED | OUTPATIENT
Start: 2018-09-17 | End: 2018-12-06 | Stop reason: SDUPTHER

## 2018-09-21 ENCOUNTER — TELEPHONE (OUTPATIENT)
Dept: FAMILY MEDICINE CLINIC | Facility: CLINIC | Age: 28
End: 2018-09-21

## 2018-09-21 DIAGNOSIS — E53.8 B12 DEFICIENCY: Primary | ICD-10-CM

## 2018-09-21 NOTE — TELEPHONE ENCOUNTER
Patient needs a RX sent to [facility] for Syringes to give herself the B12 shots  Any questions patient can be reached at [phone]

## 2018-09-24 RX ORDER — CYANOCOBALAMIN 1000 UG/ML
1000 INJECTION INTRAMUSCULAR; SUBCUTANEOUS
Qty: 12 ML | Refills: 0 | Status: SHIPPED | OUTPATIENT
Start: 2018-09-24 | End: 2019-01-11

## 2018-09-27 DIAGNOSIS — F41.1 GAD (GENERALIZED ANXIETY DISORDER): ICD-10-CM

## 2018-09-27 RX ORDER — ALPRAZOLAM 0.5 MG/1
TABLET ORAL
Qty: 30 TABLET | Refills: 0 | Status: SHIPPED | OUTPATIENT
Start: 2018-09-27 | End: 2018-10-26 | Stop reason: SDUPTHER

## 2018-10-26 DIAGNOSIS — F41.1 GAD (GENERALIZED ANXIETY DISORDER): ICD-10-CM

## 2018-11-02 RX ORDER — ALPRAZOLAM 0.5 MG/1
TABLET ORAL
Qty: 30 TABLET | Refills: 0 | Status: SHIPPED | OUTPATIENT
Start: 2018-11-02 | End: 2019-01-11 | Stop reason: SDUPTHER

## 2018-11-02 NOTE — TELEPHONE ENCOUNTER
Can you please let patient know that I have gone ahead and filled her prescription for alprazolam however I would like her to schedule follow-up appointment with   [facility] to discuss her anxiety as I won't be able to fill it any further    Thank you

## 2018-12-06 DIAGNOSIS — Z30.41 ENCOUNTER FOR SURVEILLANCE OF CONTRACEPTIVE PILLS: ICD-10-CM

## 2018-12-08 RX ORDER — NORETHINDRONE AND ETHINYL ESTRADIOL 1 MG-35MCG
KIT ORAL
Qty: 28 TABLET | Refills: 2 | Status: SHIPPED | OUTPATIENT
Start: 2018-12-08 | End: 2019-05-31

## 2018-12-10 DIAGNOSIS — Z30.41 ENCOUNTER FOR SURVEILLANCE OF CONTRACEPTIVE PILLS: ICD-10-CM

## 2018-12-11 RX ORDER — NORETHINDRONE AND ETHINYL ESTRADIOL 1 MG-35MCG
KIT ORAL
Qty: 28 TABLET | Refills: 3 | Status: SHIPPED | OUTPATIENT
Start: 2018-12-11 | End: 2019-01-11

## 2019-01-11 ENCOUNTER — OFFICE VISIT (OUTPATIENT)
Dept: FAMILY MEDICINE CLINIC | Facility: CLINIC | Age: 29
End: 2019-01-11
Payer: COMMERCIAL

## 2019-01-11 VITALS
TEMPERATURE: 96.7 F | SYSTOLIC BLOOD PRESSURE: 104 MMHG | WEIGHT: 169.4 LBS | BODY MASS INDEX: 25.09 KG/M2 | HEART RATE: 80 BPM | RESPIRATION RATE: 16 BRPM | HEIGHT: 69 IN | DIASTOLIC BLOOD PRESSURE: 70 MMHG

## 2019-01-11 DIAGNOSIS — F41.1 GAD (GENERALIZED ANXIETY DISORDER): ICD-10-CM

## 2019-01-11 DIAGNOSIS — Z90.3 POSTGASTRECTOMY MALABSORPTION: ICD-10-CM

## 2019-01-11 DIAGNOSIS — M79.7 FIBROMYALGIA: Primary | ICD-10-CM

## 2019-01-11 DIAGNOSIS — K91.2 POSTGASTRECTOMY MALABSORPTION: ICD-10-CM

## 2019-01-11 DIAGNOSIS — N91.3 PRIMARY OLIGOMENORRHEA: ICD-10-CM

## 2019-01-11 DIAGNOSIS — E53.8 VITAMIN B12 DEFICIENCY: ICD-10-CM

## 2019-01-11 DIAGNOSIS — E53.8 B12 DEFICIENCY: ICD-10-CM

## 2019-01-11 PROCEDURE — 99214 OFFICE O/P EST MOD 30 MIN: CPT | Performed by: FAMILY MEDICINE

## 2019-01-11 RX ORDER — ALPRAZOLAM 0.5 MG/1
0.5 TABLET ORAL 2 TIMES DAILY PRN
Qty: 30 TABLET | Refills: 3 | Status: SHIPPED | OUTPATIENT
Start: 2019-01-11 | End: 2019-03-30 | Stop reason: SDUPTHER

## 2019-01-11 RX ORDER — DIPHENOXYLATE HYDROCHLORIDE AND ATROPINE SULFATE 2.5; .025 MG/1; MG/1
1 TABLET ORAL DAILY
COMMUNITY
End: 2021-05-04

## 2019-01-11 RX ORDER — CHLORAL HYDRATE 500 MG
CAPSULE ORAL
COMMUNITY
End: 2021-05-04

## 2019-01-11 NOTE — PROGRESS NOTES
FAMILY PRACTICE OFFICE VISIT       NAME: Carolene Duverney  AGE: 33 y o  SEX: female       : 1990        MRN: [id number]        Assessment and Plan     Problem List Items Addressed This Visit     Fibromyalgia - Primary    Relevant Orders    CBC    Comprehensive metabolic panel    Iron, TIBC and Ferritin Panel    Vitamin B12    Vitamin D 25 hydroxy    TSH, 3rd generation    Vitamin B12 deficiency    Relevant Orders    Vitamin B12    VANITA (generalized anxiety disorder)    Relevant Medications    ALPRAZolam (XANAX) 0 5 mg tablet    Oligomenorrhea    Relevant Orders    CBC    Iron, TIBC and Ferritin Panel      Other Visit Diagnoses     Postgastrectomy malabsorption        Relevant Orders    CBC    Comprehensive metabolic panel    Iron, TIBC and Ferritin Panel    Vitamin B12    Vitamin D 25 hydroxy    TSH, 3rd generation    B12 deficiency        Relevant Medications    SYRINGE-NEEDLE, DISP, 3 ML (B-D SYRINGE/NEEDLE 3CC/25GX5/8) 25G X 5/8" 3 ML MISC    Other Relevant Orders    Vitamin B12       Patient presents for follow-up of chronic medical conditions  Symptoms of anxiety overall have improved and are mainly associated with stress/test taking at school  Patient has been using alprazolam sparingly on a as needed basis only, not on a daily basis  She has tried SSRIs/SNRIs in the past with no clinical improvement and development of side effects  Patient is self decrease dose of atenolol from 25-12 5 mg daily, it is still helpful for symptoms of occasional panic attacks  She may use it as 12 5 mg b i d  If necessary  Fibromyalgia  Patient remains under care of rheumatology  Current regimen includes Elavil 50 mg q h s  And tramadol 50 mg t i d  P r n  Lauren Post Patient states that chronic pain symptoms are well controlled but she has been experiencing increased symptoms of fatigue  Post gastrectomy malabsorption  Patient remains on vitamin B12 injections on a monthly basis and calcium with vitamin-D    She has not been using vitamin D3 supplements per se    Irregular menses  Patient is up-to-date with gyn exam   Will proceed with complete blood work as outlined above  I have spent 25 minutes with Patient  today in which greater than 50% of this time was spent in counseling/coordination of care regarding Prognosis, Risks and benefits of tx options, Intructions for management, Patient and family education, Importance of tx compliance, Risk factor reductions and Impressions  There are no Patient Instructions on file for this visit  [employer] software was used to dictate this note  It may contain errors with dictating incorrect words/spelling  Please contact provider directly with any questions  Chief Complaint     Chief Complaint   Patient presents with    Follow-up     Pt is here for f/u for dizziness from meds, fatigue       History of Present Illness     Patient presents for follow-up of chronic medical conditions  She remains under care of rheumatology for treatment of fibromyalgia and has been using Tramadol 50 mg 3 per day for chronic myalgias and arthralgias    Sleeping OK on elavil 50 mg q h s  Fatigued  Patient has been using Xanax  As needed only, not on a daily basis, patient takes alprazolam for stress related anxiety, usually associated with test taking  She is in grad school, demanding schedule, multiple tests  Patient denies anxiety on the day by day basis  No symptoms of depression  Overall she has been feeling well from emotional standpoint  Atenolol at 12 5 mg - works well  patient fell very fatigued on 25 mg and self decrease dose of medication  On vitamin D3 with calcium Daily/ caltrate     Vitamin B12 injections month"ly    Sleeping OK-  More than usual   -  " sleeping  More  Needing to take naps often    Menses are irregular  Patient had tried SSRIs/SSRIs in the past without improvement of symptoms and side effects              Review of Systems   Review of Systems Constitutional: Positive for fatigue  HENT: Negative  Eyes: Negative  Respiratory: Negative  Cardiovascular: Negative  Gastrointestinal: Negative  Endocrine: Negative  Genitourinary: Positive for menstrual problem  Musculoskeletal: Positive for arthralgias and myalgias  Allergic/Immunologic: Negative  Neurological: Negative  Hematological: Negative  Psychiatric/Behavioral: The patient is nervous/anxious          Active Problem List     Patient Active Problem List   Diagnosis    Fibromyalgia    Allergic rhinitis    Low vitamin D level    Panic attacks    Vitamin B12 deficiency    VANITA (generalized anxiety disorder)    Palpitations    Oligomenorrhea       Past Medical History:  Past Medical History:   Diagnosis Date    Ovarian cyst        Past Surgical History:  Past Surgical History:   Procedure Laterality Date    CHINTAN-EN-Y PROCEDURE  2009       Family History:  Family History   Problem Relation Age of Onset    Diabetes Mother     Breast cancer Maternal Grandmother          mid 63's    Diabetes Maternal Grandmother     Lung cancer Maternal Grandmother     Hypertension Paternal Grandmother        Social History:  Social History     Social History    Marital status: Single     Spouse name: N/A    Number of children: N/A    Years of education: N/A     Occupational History     [employer]     Social History Main Topics    Smoking status: Never Smoker    Smokeless tobacco: Never Used    Alcohol use Yes    Drug use: No    Sexual activity: Not on file     Other Topics Concern    Not on file     Social History Narrative    No narrative on file       Objective     Vitals:    01/11/19 1449   BP: 104/70   Pulse: 80   Resp: 16   Temp: (!) 96 7 °F (35 9 °C)   TempSrc: Tympanic   Weight: 76 8 kg (169 lb 6 4 oz)   Height: 5' 9" (1 753 m)     Wt Readings from Last 3 Encounters:   01/11/19 76 8 kg (169 lb 6 4 oz)   08/13/18 74 1 kg (163 lb 6 4 oz)   06/26/18 72 8 kg (160 lb 6 4 oz)       Physical Exam   Constitutional: She is oriented to person, place, and time  She appears well-developed and well-nourished  HENT:   Head: Normocephalic and atraumatic  Eyes: Conjunctivae are normal    Neck: Neck supple  Carotid bruit is not present  No thyromegaly present  Cardiovascular: Normal rate, regular rhythm and normal heart sounds  No murmur heard  Pulmonary/Chest: Effort normal and breath sounds normal  No respiratory distress  She has no wheezes  Abdominal: She exhibits no abdominal bruit  Musculoskeletal: Normal range of motion  She exhibits no edema  Neurological: She is alert and oriented to person, place, and time  No cranial nerve deficit  Coordination normal    Psychiatric: She has a normal mood and affect  Her behavior is normal    Nursing note and vitals reviewed        Pertinent Laboratory/Diagnostic Studies:  Lab Results   Component Value Date    GLUCOSE 86 02/28/2016    BUN 10 02/28/2016    CREATININE 0 74 02/28/2016    CALCIUM 9 1 02/28/2016     02/28/2016    K 4 7 02/28/2016    CO2 24 02/28/2016    CL 98 02/28/2016     Lab Results   Component Value Date    ALT 13 02/28/2016    AST 16 02/28/2016    ALKPHOS 83 02/28/2016    BILITOT 0 5 02/28/2016       Lab Results   Component Value Date    WBC 5 0 02/28/2016    HGB 12 0 02/28/2016    HCT 36 6 02/28/2016    MCV 88 02/28/2016     02/28/2016       No results found for: TSH    No results found for: CHOL  No results found for: TRIG  No results found for: HDL  No results found for: LDLCALC  No results found for: HGBA1C    Results for orders placed or performed in visit on 06/14/16   SEDIMENTATION RATE (HISTORICAL)   Result Value Ref Range    ERYTHROCYTE SEDIMENTATION RATE 16 0 - 32 mm/hr   VITAMIN B1, WHOLE BLOOD (HISTORICAL)   Result Value Ref Range    VITAMIN B1, WHOLE BLOOD 60 7 (L) 66 5 - 200 0 nmol/L       Orders Placed This Encounter   Procedures    CBC    Comprehensive metabolic panel  Iron, TIBC and Ferritin Panel    Vitamin B12    Vitamin D 25 hydroxy    TSH, 3rd generation       ALLERGIES:  Allergies   Allergen Reactions    Propoxyphene     Sulfa Antibiotics        Current Medications     Current Outpatient Prescriptions   Medication Sig Dispense Refill    ALPRAZolam (XANAX) 0 5 mg tablet Take 1 tablet (0 5 mg total) by mouth 2 (two) times a day as needed for anxiety 30 tablet 3    ALYACEN 1/35 1-35 MG-MCG per tablet TAKE 1 TABLET BY MOUTH EVERY DAY 28 tablet 2    amitriptyline (ELAVIL) 50 mg tablet Take 1 tablet by mouth daily at bedtime      atenolol (TENORMIN) 25 mg tablet Take 1/4 tablet daily 30 tablet 1    Cholecalciferol (VITAMIN D3) 2000 units capsule Take 1 capsule by mouth daily      cyanocobalamin 1,000 mcg/mL INJECT 1 ML INTRAMUSCULARLY ONCE A MONTH 3 mL 1    multivitamin (THERAGRAN) TABS Take 1 tablet by mouth daily      Omega-3 Fatty Acids (OMEGA-3 FISH OIL) 1000 MG CAPS Take by mouth      SYRINGE-NEEDLE, DISP, 3 ML (LUER LOCK SAFETY SYRINGES) 23G X 1" 3 ML MISC by Does not apply route every 30 (thirty) days 12 each 0    traMADol (ULTRAM) 50 mg tablet Take 50 mg by mouth 4 (four) times a day    3    SYRINGE-NEEDLE, DISP, 3 ML (B-D SYRINGE/NEEDLE 3CC/25GX5/8) 25G X 5/8" 3 ML MISC Use as directed for vitamin B12 injections 12 each 2     No current facility-administered medications for this visit            Health Maintenance     Health Maintenance   Topic Date Due    INFLUENZA VACCINE  07/01/2018    Depression Screening PHQ  08/13/2019    PAP SMEAR  08/15/2020    DTaP,Tdap,and Td Vaccines (2 - Td) 03/28/2027     Immunization History   Administered Date(s) Administered    Tdap 03/28/2017       Yuridia Funes MD

## 2019-01-21 ENCOUNTER — ANNUAL EXAM (OUTPATIENT)
Dept: OBGYN CLINIC | Facility: CLINIC | Age: 29
End: 2019-01-21
Payer: COMMERCIAL

## 2019-01-21 VITALS
DIASTOLIC BLOOD PRESSURE: 78 MMHG | BODY MASS INDEX: 25.03 KG/M2 | SYSTOLIC BLOOD PRESSURE: 100 MMHG | HEIGHT: 69 IN | WEIGHT: 169 LBS

## 2019-01-21 DIAGNOSIS — Z30.41 ENCOUNTER FOR SURVEILLANCE OF CONTRACEPTIVE PILLS: ICD-10-CM

## 2019-01-21 DIAGNOSIS — Z01.419 ENCOUNTER FOR GYNECOLOGICAL EXAMINATION (GENERAL) (ROUTINE) WITHOUT ABNORMAL FINDINGS: Primary | ICD-10-CM

## 2019-01-21 DIAGNOSIS — Z11.3 SCREENING FOR STD (SEXUALLY TRANSMITTED DISEASE): ICD-10-CM

## 2019-01-21 PROCEDURE — 99395 PREV VISIT EST AGE 18-39: CPT | Performed by: PHYSICIAN ASSISTANT

## 2019-01-21 NOTE — ASSESSMENT & PLAN NOTE
The current ASCCP guidelines were reviewed  Patient's last pap was 8/15/17 - WNL and therefore, a pap with HPV cotesting is not indicated at this time - can plan to repeat pap smear with HPV cotesting at age 26  I emphasized the importance of an annual pelvic and breast exam  Patient ok to defer pap today

## 2019-01-21 NOTE — PROGRESS NOTES
Assessment/Plan   Diagnoses and all orders for this visit:    Encounter for gynecological examination (general) (routine) without abnormal findings  The current ASCCP guidelines were reviewed  Patient's last pap was 8/15/17 - WNL and therefore, a pap with HPV cotesting is not indicated at this time - can plan to repeat pap smear with HPV cotesting at age 26  I emphasized the importance of an annual pelvic and breast exam  Patient ok to defer pap today  Screening for STD (sexually transmitted disease)  -     Hepatitis B surface antigen; Future  -     Hepatitis C antibody; Future  -     HIV 1/2 AG-AB combo; Future  -     RPR; Future    Encounter for surveillance of contraceptive pills  -     norgestrel-ethinyl estradiol (LO/OVRAL) 0 3 mg-30 mcg per tablet; Take 1 tablet by mouth daily X 21 days; Then, start new pack 1 tab po daily x 21 days; repeat - patient takes continuously for ovarian cysts  Contraception - offered patient a trial of alternate OCP to see if helps alleviate decreased libido  Reviewed stress levels could be a major factor as well; Reviewed switching of OCP and giving a fair 3 months trial; Patient to call with any questions/concerns or if desires to go back to the [facility]  Discussion  I have discussed the importance of monthly self-breast exams, exercise and healthy diet as well as adequate intake of calcium and vitamin D  Encourage MVI q day and r/libby importance of folic acid; Encourage 30-40 min weight bearing exercise most days of week  Encourage safe sexual practices; STD testing - declines cultures and requests STD BW - slip given  All questions have been answered to her satisfaction  RTO for APE or sooner if needed    Subjective     HPI   Alethea Tanner is a 33 y o  female who presents for annual well woman exam    Menarche - 11; LMP - 10/1; Periods occur sparingly and last 2-3 days; Patient takes continuously with history of ovarian cysts; No excessive bleeding;  No intermenstrual bleeding or spotting; Cramps are tolerable  No vulvar itch/burn; No vaginal itch/burn; No abn discharge or odor; No urinary sx - burning/pain/frequency/hematuria  (+) SBEs - no breast masses, asymmetry, nipple discharge or bleeding, changes in skin of breast, or breast tenderness bilaterally  No abd/pelvic pain or HAs;   Patient having issues with decreased libido since mid summer so about the past half year - patient did start optometry school so stress levels are increased and mother just diagnosed with breast cancer; More fatigue and a little weight gain so PCP checking on thyroid; otherwise no other changes; Has been on OCP continuously for ovarian cysts/PCOS for a long time - did trial nexplanon at one point as well  Pt is sexually active in a mutually monog sexual relationship x 8 years; No issues with intercourse; She defers STD cultures; She requests hiv/hep testing; Feels safe at home  Current contraception: Queen Ramin 1/35 - takes continuously with history of ovarian cysts/PCOS; Condom use: no  (+) PCP for routine Bw/care; Last Pap - 8/15/17 - WNL  History of abnormal Pap smear: h/o HPV in the past    Review of Systems   Constitutional: Negative for activity change, fatigue (battles fibromyalgia), fever and unexpected weight change  HENT: Negative for congestion, dental problem, sinus pain and sinus pressure  Eyes: Negative for visual disturbance  Respiratory: Negative for cough, shortness of breath and wheezing  Cardiovascular: Negative for chest pain and leg swelling  Gastrointestinal: Negative for abdominal distention, abdominal pain, blood in stool, constipation, diarrhea, nausea and vomiting  Endocrine: Negative for polydipsia  Genitourinary: Negative for difficulty urinating, dyspareunia, dysuria, frequency, hematuria, menstrual problem, pelvic pain, urgency, vaginal bleeding, vaginal discharge and vaginal pain  Musculoskeletal: Negative for arthralgias and back pain  Allergic/Immunologic: Negative for environmental allergies  Neurological: Negative for dizziness, seizures and headaches  Psychiatric/Behavioral: Negative for dysphoric mood and sleep disturbance  The patient is nervous/anxious  The following portions of the patient's history were reviewed and updated as appropriate: allergies, current medications, past family history, past medical history, past social history, past surgical history and problem list          OB History      Para Term  AB Living    0 0 0 0 0 0    SAB TAB Ectopic Multiple Live Births    0 0 0 0 0        Obstetric Comments    Menarche             Past Medical History:   Diagnosis Date    ADHD     Anxiety     Fibromyalgia     HPV (human papilloma virus) infection     early 25s    Ovarian cyst     PCOS (polycystic ovarian syndrome)     Vitamin B12 deficiency        Past Surgical History:   Procedure Laterality Date    CHINTAN-EN-Y PROCEDURE         Family History   Problem Relation Age of Onset    Diabetes Mother    Rufino Chavira Breast cancer Mother 62        Invasive lobular carcinoma    Breast cancer Maternal Grandmother          mid 63's    Diabetes Maternal Grandmother     Lung cancer Maternal Grandmother     Hypertension Paternal Grandmother        Social History     Social History    Marital status: Single     Spouse name: N/A    Number of children: N/A    Years of education: N/A     Occupational History     [employer]     Social History Main Topics    Smoking status: Never Smoker    Smokeless tobacco: Never Used    Alcohol use No    Drug use: No    Sexual activity: Yes     Partners: Male     Birth control/ protection: OCP     Other Topics Concern    Not on file     Social History Narrative    No narrative on file         Current Outpatient Prescriptions:     ALPRAZolam (XANAX) 0 5 mg tablet, Take 1 tablet (0 5 mg total) by mouth 2 (two) times a day as needed for anxiety, Disp: 30 tablet, Rfl: 3    ALYACEN 1/35 1-35 MG-MCG per tablet, TAKE 1 TABLET BY MOUTH EVERY DAY, Disp: 28 tablet, Rfl: 2    amitriptyline (ELAVIL) 50 mg tablet, Take 1 tablet by mouth daily at bedtime, Disp: , Rfl:     atenolol (TENORMIN) 25 mg tablet, Take 1/4 tablet daily, Disp: 30 tablet, Rfl: 1    Calcium Carbonate-Vitamin D (CALCIUM-D PO), Take by mouth, Disp: , Rfl:     cyanocobalamin 1,000 mcg/mL, INJECT 1 ML INTRAMUSCULARLY ONCE A MONTH, Disp: 3 mL, Rfl: 1    multivitamin (THERAGRAN) TABS, Take 1 tablet by mouth daily, Disp: , Rfl:     Omega-3 Fatty Acids (OMEGA-3 FISH OIL) 1000 MG CAPS, Take by mouth, Disp: , Rfl:     traMADol (ULTRAM) 50 mg tablet, Take 50 mg by mouth 4 (four) times a day  , Disp: , Rfl: 3    Cholecalciferol (VITAMIN D3) 2000 units capsule, Take 1 capsule by mouth daily, Disp: , Rfl:     norgestrel-ethinyl estradiol (LO/OVRAL) 0 3 mg-30 mcg per tablet, Take 1 tablet by mouth daily X 21 days; Then, start new pack 1 tab po daily x 21 days; repeat - patient takes continuously for ovarian cysts, Disp: 28 tablet, Rfl: 14    SYRINGE-NEEDLE, DISP, 3 ML (B-D SYRINGE/NEEDLE 3CC/25GX5/8) 25G X 5/8" 3 ML MISC, Use as directed for vitamin B12 injections, Disp: 12 each, Rfl: 2    SYRINGE-NEEDLE, DISP, 3 ML (LUER LOCK SAFETY SYRINGES) 23G X 1" 3 ML MISC, by Does not apply route every 30 (thirty) days, Disp: 12 each, Rfl: 0    Allergies   Allergen Reactions    Propoxyphene     Sulfa Antibiotics        Objective   Vitals:    01/21/19 1143   BP: 100/78   BP Location: Left arm   Patient Position: Sitting   Cuff Size: Standard   Weight: 76 7 kg (169 lb)   Height: 5' 9" (1 753 m)     Physical Exam   Constitutional: She appears well-developed and well-nourished  No distress  Neck: No thyromegaly present  Cardiovascular: Normal rate, regular rhythm and normal heart sounds  No murmur heard  Pulmonary/Chest: Effort normal and breath sounds normal  No respiratory distress  She has no wheezes   Right breast exhibits no inverted nipple, no mass, no nipple discharge, no skin change and no tenderness  Left breast exhibits no inverted nipple, no mass, no nipple discharge, no skin change and no tenderness  Breasts are symmetrical    Abdominal: Soft  She exhibits no distension and no mass  There is no tenderness  Genitourinary: Vagina normal and uterus normal  Pelvic exam was performed with patient supine  There is no rash, tenderness or lesion on the right labia  There is no rash, tenderness or lesion on the left labia  Uterus is not deviated, not enlarged, not fixed and not tender  Cervix exhibits no motion tenderness, no discharge and no friability  Right adnexum displays no mass, no tenderness and no fullness  Left adnexum displays no mass, no tenderness and no fullness  No erythema, tenderness or bleeding in the vagina  No foreign body in the vagina  No vaginal discharge found  Musculoskeletal: She exhibits no edema  Lymphadenopathy:     She has no cervical adenopathy  She has no axillary adenopathy  Right: No inguinal adenopathy present  Left: No inguinal adenopathy present  Neurological: She is alert  Skin: Skin is warm  She is not diaphoretic  Psychiatric: She has a normal mood and affect  Her behavior is normal    Vitals reviewed

## 2019-01-28 DIAGNOSIS — R00.2 PALPITATIONS: ICD-10-CM

## 2019-01-29 RX ORDER — ATENOLOL 25 MG/1
TABLET ORAL
Qty: 30 TABLET | Refills: 3 | Status: SHIPPED | OUTPATIENT
Start: 2019-01-29 | End: 2019-03-29 | Stop reason: SDUPTHER

## 2019-02-09 ENCOUNTER — OFFICE VISIT (OUTPATIENT)
Dept: FAMILY MEDICINE CLINIC | Facility: CLINIC | Age: 29
End: 2019-02-09
Payer: COMMERCIAL

## 2019-02-09 VITALS
RESPIRATION RATE: 12 BRPM | SYSTOLIC BLOOD PRESSURE: 112 MMHG | BODY MASS INDEX: 24.97 KG/M2 | HEART RATE: 64 BPM | DIASTOLIC BLOOD PRESSURE: 76 MMHG | WEIGHT: 168.6 LBS | TEMPERATURE: 96 F | HEIGHT: 69 IN

## 2019-02-09 DIAGNOSIS — R05.9 COUGH: ICD-10-CM

## 2019-02-09 DIAGNOSIS — R06.2 WHEEZING: ICD-10-CM

## 2019-02-09 DIAGNOSIS — J06.9 UPPER RESPIRATORY TRACT INFECTION, UNSPECIFIED TYPE: Primary | ICD-10-CM

## 2019-02-09 PROCEDURE — 99213 OFFICE O/P EST LOW 20 MIN: CPT | Performed by: FAMILY MEDICINE

## 2019-02-09 PROCEDURE — 3008F BODY MASS INDEX DOCD: CPT | Performed by: FAMILY MEDICINE

## 2019-02-09 RX ORDER — ALBUTEROL SULFATE 90 UG/1
2 AEROSOL, METERED RESPIRATORY (INHALATION) EVERY 6 HOURS PRN
Qty: 18 G | Refills: 0 | Status: SHIPPED | OUTPATIENT
Start: 2019-02-09 | End: 2020-03-16 | Stop reason: SDUPTHER

## 2019-02-09 RX ORDER — AZITHROMYCIN 250 MG/1
TABLET, FILM COATED ORAL
Qty: 6 TABLET | Refills: 0 | Status: SHIPPED | OUTPATIENT
Start: 2019-02-09 | End: 2019-02-13

## 2019-02-09 RX ORDER — BENZONATATE 200 MG/1
200 CAPSULE ORAL 3 TIMES DAILY PRN
Qty: 30 CAPSULE | Refills: 0 | Status: SHIPPED | OUTPATIENT
Start: 2019-02-09 | End: 2020-03-16 | Stop reason: SDUPTHER

## 2019-02-09 NOTE — PROGRESS NOTES
FAMILY PRACTICE OFFICE VISIT       NAME: Rani Acosta  AGE: 33 y o  SEX: female       : 1990        MRN: [id number]    DATE: 2019  TIME: 7:33 PM    Assessment and Plan     Problem List Items Addressed This Visit        Respiratory    Upper respiratory tract infection - Primary    Relevant Medications    azithromycin (ZITHROMAX) 250 mg tablet       Other    Cough    Relevant Medications    benzonatate (TESSALON) 200 MG capsule    albuterol (VENTOLIN HFA) 90 mcg/act inhaler    Wheezing    Relevant Medications    albuterol (VENTOLIN HFA) 90 mcg/act inhaler        Patient presents today with a 1 week history of symptoms that appear consistent with upper respiratory infection  No wheezing noted on exam today  Will start patient on azithromycin  Will call in Rx for benzonatate to take for cough as needed  Will provide Rx for Ventolin to use for wheezing as needed  Continue with symptomatic treatment and supportive measures including adequate hydration  Return parameters discussed  There are no Patient Instructions on file for this visit  Chief Complaint     Chief Complaint   Patient presents with    Cough     x's 4+ days    Wheezing    Sore Throat       History of Present Illness     HPI  53-year-old female presents with a 1 week h/o cough, losing voice, ST, nasal congestion, PND, wheezng  Has been taking mucinex night time   has been exposed to sick contacts    Review of Systems   Review of Systems   Constitutional: Negative for chills and fever  HENT: Positive for congestion and postnasal drip  Respiratory: Positive for cough and wheezing  Negative for shortness of breath          Active Problem List     Patient Active Problem List   Diagnosis    Fibromyalgia    Allergic rhinitis    Low vitamin D level    Panic attacks    Vitamin B12 deficiency    VANITA (generalized anxiety disorder)    Palpitations    Oligomenorrhea    Encounter for gynecological examination (general) (routine) without abnormal findings    Upper respiratory tract infection    Cough    Wheezing       Past Medical History:  Past Medical History:   Diagnosis Date    ADHD     Anxiety     Fibromyalgia     HPV (human papilloma virus) infection     early 25s    Ovarian cyst     PCOS (polycystic ovarian syndrome)     Vitamin B12 deficiency        Past Surgical History:  Past Surgical History:   Procedure Laterality Date    CHINTAN-EN-Y PROCEDURE  2009       Family History:  Family History   Problem Relation Age of Onset    Diabetes Mother     Breast cancer Mother 62        Invasive lobular carcinoma    Breast cancer Maternal Grandmother          mid 63's    Diabetes Maternal Grandmother     Lung cancer Maternal Grandmother     Hypertension Paternal Grandmother        Social History:  Social History     Socioeconomic History    Marital status: Single     Spouse name: Not on file    Number of children: Not on file    Years of education: Not on file    Highest education level: Not on file   Occupational History     Employer: [employer]  [address] [employer] Financial resource strain: Not on file    Food insecurity:     Worry: Not on file     Inability: Not on file    Transportation needs:     Medical: Not on file     Non-medical: Not on file   Tobacco Use    Smoking status: Never Smoker    Smokeless tobacco: Never Used   Substance and Sexual Activity    Alcohol use: No    Drug use: No    Sexual activity: Yes     Partners: Male     Birth control/protection: OCP   Lifestyle    Physical activity:     Days per week: Not on file     Minutes per session: Not on file    Stress: Not on file   Relationships    Social connections:     Talks on phone: Not on file     Gets together: Not on file     Attends [employer] service: Not on file     Active member of club or organization: Not on file     Attends meetings of clubs or organizations: Not on file     Relationship status: Not on file   Nigel Giordano Intimate partner violence:     Fear of current or ex partner: Not on file     Emotionally abused: Not on file     Physically abused: Not on file     Forced sexual activity: Not on file   Other Topics Concern    Not on file   Social History Narrative    Not on file     I have reviewed the patient's medical history in detail; there are no changes to the history as noted in the electronic medical record  Objective     Vitals:    02/09/19 1019   BP: 112/76   Pulse: 64   Resp: 12   Temp: (!) 96 °F (35 6 °C)     Wt Readings from Last 3 Encounters:   02/09/19 76 5 kg (168 lb 9 6 oz)   01/21/19 76 7 kg (169 lb)   01/11/19 76 8 kg (169 lb 6 4 oz)       Physical Exam   Constitutional: She appears well-developed and well-nourished  HENT:   Head: Normocephalic and atraumatic  Mouth/Throat: Oropharynx is clear and moist    TMs intact and clear  Nares with edema noted  Posterior oropharynx with drainage and erythema noted  Neck: Normal range of motion  Neck supple  Cardiovascular: Normal rate, regular rhythm and normal heart sounds  Pulmonary/Chest: Effort normal and breath sounds normal  She has no wheezes  Lymphadenopathy:     She has no cervical adenopathy  Nursing note and vitals reviewed        Pertinent Laboratory/Diagnostic Studies:  Lab Results   Component Value Date    GLUCOSE 86 02/28/2016    BUN 10 02/28/2016    CREATININE 0 74 02/28/2016    CALCIUM 9 1 02/28/2016     02/28/2016    K 4 7 02/28/2016    CO2 24 02/28/2016    CL 98 02/28/2016     Lab Results   Component Value Date    ALT 13 02/28/2016    AST 16 02/28/2016    ALKPHOS 83 02/28/2016    BILITOT 0 5 02/28/2016       Lab Results   Component Value Date    WBC 5 0 02/28/2016    HGB 12 0 02/28/2016    HCT 36 6 02/28/2016    MCV 88 02/28/2016     02/28/2016       No results found for: TSH    No results found for: CHOL  No results found for: TRIG  No results found for: HDL  No results found for: LDLCALC  No results found for: HGBA1C    Results for orders placed or performed in visit on 06/14/16   SEDIMENTATION RATE (HISTORICAL)   Result Value Ref Range    ERYTHROCYTE SEDIMENTATION RATE 16 0 - 32 mm/hr   VITAMIN B1, WHOLE BLOOD (HISTORICAL)   Result Value Ref Range    VITAMIN B1, WHOLE BLOOD 60 7 (L) 66 5 - 200 0 nmol/L       No orders of the defined types were placed in this encounter  ALLERGIES:  Allergies   Allergen Reactions    Sulfa Antibiotics Rash    Propoxyphene Nausea Only       Current Medications     Current Outpatient Medications   Medication Sig Dispense Refill    ALPRAZolam (XANAX) 0 5 mg tablet Take 1 tablet (0 5 mg total) by mouth 2 (two) times a day as needed for anxiety 30 tablet 3    ALYACEN 1/35 1-35 MG-MCG per tablet TAKE 1 TABLET BY MOUTH EVERY DAY 28 tablet 2    amitriptyline (ELAVIL) 50 mg tablet Take 1 tablet by mouth daily at bedtime      atenolol (TENORMIN) 25 mg tablet Take 1/2 tablet twice a day 30 tablet 3    Calcium Carbonate-Vitamin D (CALCIUM-D PO) Take by mouth      Cholecalciferol (VITAMIN D3) 2000 units capsule Take 1 capsule by mouth daily      cyanocobalamin 1,000 mcg/mL INJECT 1 ML INTRAMUSCULARLY ONCE A MONTH 3 mL 1    multivitamin (THERAGRAN) TABS Take 1 tablet by mouth daily      norgestrel-ethinyl estradiol (LO/OVRAL) 0 3 mg-30 mcg per tablet Take 1 tablet by mouth daily X 21 days;  Then, start new pack 1 tab po daily x 21 days; repeat - patient takes continuously for ovarian cysts 28 tablet 14    Omega-3 Fatty Acids (OMEGA-3 FISH OIL) 1000 MG CAPS Take by mouth      SYRINGE-NEEDLE, DISP, 3 ML (B-D SYRINGE/NEEDLE 3CC/25GX5/8) 25G X 5/8" 3 ML MISC Use as directed for vitamin B12 injections 12 each 2    SYRINGE-NEEDLE, DISP, 3 ML (LUER LOCK SAFETY SYRINGES) 23G X 1" 3 ML MISC by Does not apply route every 30 (thirty) days 12 each 0    traMADol (ULTRAM) 50 mg tablet Take 50 mg by mouth 4 (four) times a day    3    albuterol (VENTOLIN HFA) 90 mcg/act inhaler Inhale 2 puffs every 6 (six) hours as needed for wheezing 18 g 0    azithromycin (ZITHROMAX) 250 mg tablet Take 2 tabs first day and 1 tab for days 2-5 6 tablet 0    benzonatate (TESSALON) 200 MG capsule Take 1 capsule (200 mg total) by mouth 3 (three) times a day as needed for cough 30 capsule 0     No current facility-administered medications for this visit            Health Maintenance     Health Maintenance   Topic Date Due    INFLUENZA VACCINE  07/01/2018    Depression Screening PHQ  08/13/2019    BMI: Adult  02/09/2020    PAP SMEAR  08/15/2020    DTaP,Tdap,and Td Vaccines (2 - Td) 03/28/2027    HEPATITIS B VACCINES  Aged Out     Immunization History   Administered Date(s) Administered    Tdap 03/28/2017       Becky Hinkle MD

## 2019-02-12 PROBLEM — R05.9 COUGH: Status: ACTIVE | Noted: 2019-02-12

## 2019-02-12 PROBLEM — J06.9 UPPER RESPIRATORY TRACT INFECTION: Status: ACTIVE | Noted: 2019-02-12

## 2019-02-12 PROBLEM — R06.2 WHEEZING: Status: ACTIVE | Noted: 2019-02-12

## 2019-02-17 DIAGNOSIS — E53.8 VITAMIN B12 DEFICIENCY: ICD-10-CM

## 2019-02-17 RX ORDER — CYANOCOBALAMIN 1000 UG/ML
INJECTION INTRAMUSCULAR; SUBCUTANEOUS
Qty: 3 ML | Refills: 3 | Status: SHIPPED | OUTPATIENT
Start: 2019-02-17 | End: 2020-01-26

## 2019-03-29 DIAGNOSIS — R00.2 PALPITATIONS: ICD-10-CM

## 2019-03-29 RX ORDER — ATENOLOL 25 MG/1
TABLET ORAL
Qty: 30 TABLET | Refills: 5 | Status: SHIPPED | OUTPATIENT
Start: 2019-03-29 | End: 2019-07-29 | Stop reason: SDUPTHER

## 2019-03-30 DIAGNOSIS — F41.1 GAD (GENERALIZED ANXIETY DISORDER): ICD-10-CM

## 2019-03-30 RX ORDER — ALPRAZOLAM 0.5 MG/1
0.5 TABLET ORAL 2 TIMES DAILY PRN
Qty: 30 TABLET | Refills: 1 | Status: SHIPPED | OUTPATIENT
Start: 2019-03-30 | End: 2019-05-02 | Stop reason: SDUPTHER

## 2019-05-02 DIAGNOSIS — F41.1 GAD (GENERALIZED ANXIETY DISORDER): ICD-10-CM

## 2019-05-03 RX ORDER — ALPRAZOLAM 0.5 MG/1
TABLET ORAL
Qty: 30 TABLET | Refills: 1 | Status: SHIPPED | OUTPATIENT
Start: 2019-05-03 | End: 2019-08-15 | Stop reason: SDUPTHER

## 2019-05-30 ENCOUNTER — TELEPHONE (OUTPATIENT)
Dept: OBGYN CLINIC | Facility: CLINIC | Age: 29
End: 2019-05-30

## 2019-05-31 DIAGNOSIS — Z30.41 ENCOUNTER FOR SURVEILLANCE OF CONTRACEPTIVE PILLS: Primary | ICD-10-CM

## 2019-06-07 DIAGNOSIS — F41.1 GAD (GENERALIZED ANXIETY DISORDER): ICD-10-CM

## 2019-06-07 LAB
IRON SATN MFR SERPL: 16 % (ref 15–55)
IRON SERPL-MCNC: 88 UG/DL (ref 27–159)
TIBC SERPL-MCNC: 555 UG/DL (ref 250–450)
TSH SERPL DL<=0.005 MIU/L-ACNC: 2.64 UIU/ML (ref 0.45–4.5)
UIBC SERPL-MCNC: 467 UG/DL (ref 131–425)
VIT B12 SERPL-MCNC: 711 PG/ML (ref 232–1245)

## 2019-06-10 RX ORDER — ALPRAZOLAM 0.5 MG/1
TABLET ORAL
Qty: 30 TABLET | Refills: 1 | OUTPATIENT
Start: 2019-06-10

## 2019-06-21 ENCOUNTER — TELEPHONE (OUTPATIENT)
Dept: FAMILY MEDICINE CLINIC | Facility: CLINIC | Age: 29
End: 2019-06-21

## 2019-07-29 ENCOUNTER — TELEPHONE (OUTPATIENT)
Dept: FAMILY MEDICINE CLINIC | Facility: CLINIC | Age: 29
End: 2019-07-29

## 2019-07-29 DIAGNOSIS — R00.2 PALPITATIONS: ICD-10-CM

## 2019-07-29 RX ORDER — ATENOLOL 25 MG/1
TABLET ORAL
Qty: 90 TABLET | Refills: 0 | Status: SHIPPED | OUTPATIENT
Start: 2019-07-29 | End: 2019-10-30 | Stop reason: SDUPTHER

## 2019-07-29 NOTE — TELEPHONE ENCOUNTER
Please contact patient  I would like her to proceed with repeat blood work  I did place orders for complete blood work panel in January, but only few of those tests were reported in June indicating decrease in iron stores  Please reprint complete blood work as ordered on January 11th, mail to patient, and advise to proceed    Thank you

## 2019-08-02 DIAGNOSIS — Z30.41 ENCOUNTER FOR SURVEILLANCE OF CONTRACEPTIVE PILLS: ICD-10-CM

## 2019-08-15 DIAGNOSIS — F41.1 GAD (GENERALIZED ANXIETY DISORDER): ICD-10-CM

## 2019-08-15 RX ORDER — ALPRAZOLAM 0.5 MG/1
TABLET ORAL
Qty: 30 TABLET | Refills: 1 | Status: SHIPPED | OUTPATIENT
Start: 2019-08-15 | End: 2019-10-08 | Stop reason: SDUPTHER

## 2019-10-08 DIAGNOSIS — F41.1 GAD (GENERALIZED ANXIETY DISORDER): ICD-10-CM

## 2019-10-09 ENCOUNTER — TELEPHONE (OUTPATIENT)
Dept: FAMILY MEDICINE CLINIC | Facility: CLINIC | Age: 29
End: 2019-10-09

## 2019-10-09 RX ORDER — ALPRAZOLAM 0.5 MG/1
0.5 TABLET ORAL 2 TIMES DAILY PRN
Qty: 30 TABLET | Refills: 1 | Status: SHIPPED | OUTPATIENT
Start: 2019-10-09 | End: 2020-04-09 | Stop reason: SDUPTHER

## 2019-10-09 NOTE — TELEPHONE ENCOUNTER
Please contact patient  I have not seen her in the office since January  I just refilled her Xanax  Please advise her to schedule checkup within next 1-2 months at her convenience    Thank you

## 2019-10-09 NOTE — TELEPHONE ENCOUNTER
Spoke with patient gave her the information and she will contact us back after looking at her schedule to make an appointment

## 2019-10-30 DIAGNOSIS — R00.2 PALPITATIONS: ICD-10-CM

## 2019-10-30 RX ORDER — ATENOLOL 25 MG/1
TABLET ORAL
Qty: 90 TABLET | Refills: 0 | Status: SHIPPED | OUTPATIENT
Start: 2019-10-30 | End: 2020-04-09

## 2020-01-19 DIAGNOSIS — Z30.41 ENCOUNTER FOR SURVEILLANCE OF CONTRACEPTIVE PILLS: ICD-10-CM

## 2020-01-20 RX ORDER — NORETHINDRONE AND ETHINYL ESTRADIOL 1 MG-35MCG
KIT ORAL
Qty: 84 TABLET | Refills: 0 | Status: SHIPPED | OUTPATIENT
Start: 2020-01-20 | End: 2020-03-06 | Stop reason: SDUPTHER

## 2020-01-25 DIAGNOSIS — E53.8 VITAMIN B12 DEFICIENCY: ICD-10-CM

## 2020-01-26 RX ORDER — CYANOCOBALAMIN 1000 UG/ML
INJECTION INTRAMUSCULAR; SUBCUTANEOUS
Qty: 3 ML | Refills: 0 | Status: SHIPPED | OUTPATIENT
Start: 2020-01-26 | End: 2020-04-09 | Stop reason: SDUPTHER

## 2020-03-03 ENCOUNTER — TELEPHONE (OUTPATIENT)
Dept: OBGYN CLINIC | Facility: CLINIC | Age: 30
End: 2020-03-03

## 2020-03-03 DIAGNOSIS — Z30.41 ENCOUNTER FOR SURVEILLANCE OF CONTRACEPTIVE PILLS: ICD-10-CM

## 2020-03-05 DIAGNOSIS — Z30.41 ENCOUNTER FOR SURVEILLANCE OF CONTRACEPTIVE PILLS: ICD-10-CM

## 2020-03-06 DIAGNOSIS — Z30.41 ENCOUNTER FOR SURVEILLANCE OF CONTRACEPTIVE PILLS: ICD-10-CM

## 2020-03-06 RX ORDER — NORETHINDRONE AND ETHINYL ESTRADIOL 1 MG-35MCG
1 KIT ORAL DAILY
Qty: 84 TABLET | Refills: 0 | Status: SHIPPED | OUTPATIENT
Start: 2020-03-06 | End: 2020-05-22

## 2020-03-09 RX ORDER — NORGESTREL-ETHINYL ESTRADIOL 0.3-0.03MG
TABLET ORAL
Qty: 90 TABLET | Refills: 1 | Status: SHIPPED | OUTPATIENT
Start: 2020-03-09 | End: 2020-03-10 | Stop reason: SDUPTHER

## 2020-03-10 DIAGNOSIS — Z30.41 ENCOUNTER FOR SURVEILLANCE OF CONTRACEPTIVE PILLS: ICD-10-CM

## 2020-03-16 ENCOUNTER — OFFICE VISIT (OUTPATIENT)
Dept: FAMILY MEDICINE CLINIC | Facility: CLINIC | Age: 30
End: 2020-03-16
Payer: COMMERCIAL

## 2020-03-16 VITALS
DIASTOLIC BLOOD PRESSURE: 72 MMHG | SYSTOLIC BLOOD PRESSURE: 100 MMHG | RESPIRATION RATE: 16 BRPM | TEMPERATURE: 96.1 F | WEIGHT: 166.25 LBS | HEIGHT: 69 IN | HEART RATE: 73 BPM | BODY MASS INDEX: 24.62 KG/M2 | OXYGEN SATURATION: 96 %

## 2020-03-16 DIAGNOSIS — R06.2 WHEEZING: ICD-10-CM

## 2020-03-16 DIAGNOSIS — J06.9 UPPER RESPIRATORY TRACT INFECTION, UNSPECIFIED TYPE: Primary | ICD-10-CM

## 2020-03-16 DIAGNOSIS — R05.9 COUGH: ICD-10-CM

## 2020-03-16 DIAGNOSIS — J32.9 SINUSITIS, UNSPECIFIED CHRONICITY, UNSPECIFIED LOCATION: ICD-10-CM

## 2020-03-16 PROCEDURE — 1036F TOBACCO NON-USER: CPT | Performed by: FAMILY MEDICINE

## 2020-03-16 PROCEDURE — 99213 OFFICE O/P EST LOW 20 MIN: CPT | Performed by: FAMILY MEDICINE

## 2020-03-16 RX ORDER — BENZONATATE 200 MG/1
200 CAPSULE ORAL 3 TIMES DAILY PRN
Qty: 30 CAPSULE | Refills: 0 | Status: SHIPPED | OUTPATIENT
Start: 2020-03-16 | End: 2020-04-09

## 2020-03-16 RX ORDER — AZITHROMYCIN 250 MG/1
TABLET, FILM COATED ORAL
Qty: 6 TABLET | Refills: 0 | Status: SHIPPED | OUTPATIENT
Start: 2020-03-16 | End: 2020-03-20

## 2020-03-16 RX ORDER — ALBUTEROL SULFATE 90 UG/1
2 AEROSOL, METERED RESPIRATORY (INHALATION) EVERY 6 HOURS PRN
Qty: 18 G | Refills: 0 | Status: SHIPPED | OUTPATIENT
Start: 2020-03-16 | End: 2020-06-10

## 2020-03-16 NOTE — PROGRESS NOTES
FAMILY PRACTICE OFFICE VISIT       NAME: Laney Hall  AGE: 26 y o  SEX: female       : 1990        MRN: [id number]    DATE: 3/17/2020  TIME: 11:24 PM    Assessment and Plan     Problem List Items Addressed This Visit        Respiratory    Upper respiratory tract infection - Primary    Relevant Medications    azithromycin (ZITHROMAX) 250 mg tablet    Sinusitis    Relevant Medications    azithromycin (ZITHROMAX) 250 mg tablet       Other    Cough    Relevant Medications    benzonatate (TESSALON) 200 MG capsule    albuterol (Ventolin HFA) 90 mcg/act inhaler    Wheezing    Relevant Medications    albuterol (Ventolin HFA) 90 mcg/act inhaler        79-year-old female presents today with symptoms that appear consistent with upper respiratory infection with associated wheezing that occurred last night  Patient does have mild end expiratory wheezing noted today  Declines nebulizer treatment in the office today  Is not in any acute distress  I will start patient on azithromycin, provided Rx for Ventolin to use if needed  Will also call in Rx for benzonatate to use for cough as needed  Continue with symptomatic treatment and supportive measures including maintaining adequate hydration  May benefit from nasal saline rinses, local honey, elderberry syrup  Return parameters discussed in detail  There are no Patient Instructions on file for this visit  Chief Complaint     Chief Complaint   Patient presents with    Cough     CONGESTION     POST NASAL DRIP    Fever     101 6 ON FRIDAY , OTC TYLENOL AND ADVIL Q3HR        History of Present Illness     HPI   79-year-old female presents today with what she thought started out as allergies with postnasal drainage and sneezing  Last Friday, patient started to cough, had a temperature of 101 6°  She was taking Tylenol alternating with Advil every 3 hours which completely resolved her fever  She has not had any temperature since Friday    On Saturday and Sunday, started to feel that her cough worsened  She did have wheezing last night when she was laying flat  Patient states cough is keeping her up at night  She also has postnasal drainage, nasal congestion, cough  Has history of pneumonia in the past     Review of Systems   Review of Systems   Constitutional: Negative for appetite change  No further temperature   HENT: Positive for congestion and postnasal drip  Respiratory: Positive for cough and wheezing  Negative for shortness of breath          Active Problem List     Patient Active Problem List   Diagnosis    Fibromyalgia    Allergic rhinitis    Low vitamin D level    Panic attacks    Vitamin B12 deficiency    VANITA (generalized anxiety disorder)    Palpitations    Oligomenorrhea    Encounter for gynecological examination (general) (routine) without abnormal findings    Upper respiratory tract infection    Cough    Wheezing    Sinusitis       Past Medical History:  Past Medical History:   Diagnosis Date    ADHD     Anxiety     Fibromyalgia     HPV (human papilloma virus) infection     early 25s    Ovarian cyst     PCOS (polycystic ovarian syndrome)     Vitamin B12 deficiency        Past Surgical History:  Past Surgical History:   Procedure Laterality Date    CHINTAN-EN-Y PROCEDURE  2009       Family History:  Family History   Problem Relation Age of Onset    Diabetes Mother     Breast cancer Mother 62        Invasive lobular carcinoma    Breast cancer Maternal Grandmother          mid 63's    Diabetes Maternal Grandmother     Lung cancer Maternal Grandmother     Hypertension Paternal Grandmother        Social History:  Social History     Socioeconomic History    Marital status: Single     Spouse name: Not on file    Number of children: Not on file    Years of education: Not on file    Highest education level: Not on file   Occupational History     Employer: [employer] resource strain: Not on file    Food insecurity:     Worry: Not on file     Inability: Not on file    Transportation needs:     Medical: Not on file     Non-medical: Not on file   Tobacco Use    Smoking status: Never Smoker    Smokeless tobacco: Never Used   Substance and Sexual Activity    Alcohol use: No    Drug use: No    Sexual activity: Yes     Partners: Male     Birth control/protection: OCP   Lifestyle    Physical activity:     Days per week: Not on file     Minutes per session: Not on file    Stress: Not on file   Relationships    Social connections:     Talks on phone: Not on file     Gets together: Not on file     Attends [employer] service: Not on file     Active member of club or organization: Not on file     Attends meetings of clubs or organizations: Not on file     Relationship status: Not on file    Intimate partner violence:     Fear of current or ex partner: Not on file     Emotionally abused: Not on file     Physically abused: Not on file     Forced sexual activity: Not on file   Other Topics Concern    Not on file   Social History Narrative    Not on file     I have reviewed the patient's medical history in detail; there are no changes to the history as noted in the electronic medical record  Objective     Vitals:    03/16/20 1213   BP: 100/72   Pulse:    Resp:    Temp:    SpO2:      Wt Readings from Last 3 Encounters:   03/16/20 75 4 kg (166 lb 4 oz)   02/09/19 76 5 kg (168 lb 9 6 oz)   01/21/19 76 7 kg (169 lb)     Vitals:    03/16/20 1151 03/16/20 1213   BP: 90/88 100/72   BP Location: Left arm    Patient Position: Sitting    Cuff Size: Adult    Pulse: 73    Resp: 16    Temp: (!) 96 1 °F (35 6 °C)    TempSrc: Tympanic    SpO2: 96%    Weight: 75 4 kg (166 lb 4 oz)    Height: 5' 9" (1 753 m)          Physical Exam   Constitutional: She appears well-developed and well-nourished  HENT:   Head: Normocephalic and atraumatic     Mouth/Throat: Oropharynx is clear and moist    TMs intact and clear  Nares with mild edema noted  Tender to palpation of maxillary sinuses bilaterally  Posterior pharynx with minimal drainage and minimal erythema noted  Eyes: Pupils are equal, round, and reactive to light  Conjunctivae and EOM are normal    Neck: Normal range of motion  Neck supple  Cardiovascular: Normal rate, regular rhythm and normal heart sounds  Pulmonary/Chest: Effort normal and breath sounds normal  No respiratory distress  Mild end expiratory wheezes noted in upper airways   Musculoskeletal: Normal range of motion  Lymphadenopathy:     She has no cervical adenopathy  Neurological: She is alert  Nursing note and vitals reviewed  Pertinent Laboratory/Diagnostic Studies:  Lab Results   Component Value Date    GLUCOSE 86 02/28/2016    BUN 10 02/28/2016    CREATININE 0 74 02/28/2016    CALCIUM 9 1 02/28/2016     02/28/2016    K 4 7 02/28/2016    CO2 24 02/28/2016    CL 98 02/28/2016     Lab Results   Component Value Date    ALT 13 02/28/2016    AST 16 02/28/2016    ALKPHOS 83 02/28/2016    BILITOT 0 5 02/28/2016       Lab Results   Component Value Date    WBC 5 0 02/28/2016    HGB 12 0 02/28/2016    HCT 36 6 02/28/2016    MCV 88 02/28/2016     02/28/2016       Lab Results   Component Value Date    TSH 2 640 06/06/2019       No results found for: CHOL  No results found for: TRIG  No results found for: HDL  No results found for: LDLCALC  No results found for: HGBA1C    Results for orders placed or performed in visit on 06/06/19   TIBC   Result Value Ref Range    Total Iron Binding Capacity (TIBC) 555 (HH) 250 - 450 ug/dL    UIBC 467 (H) 131 - 425 ug/dL    Iron, Serum 88 27 - 159 ug/dL    Iron Saturation 16 15 - 55 %   TSH, 3rd generation   Result Value Ref Range    TSH 2 640 0 450 - 4 500 uIU/mL   Vitamin B12   Result Value Ref Range    Vitamin B-12 711 232 - 1,245 pg/mL       No orders of the defined types were placed in this encounter        ALLERGIES:  Allergies Allergen Reactions    Sulfa Antibiotics Rash    Propoxyphene Nausea Only       Current Medications     Current Outpatient Medications   Medication Sig Dispense Refill    ALPRAZolam (XANAX) 0 5 mg tablet Take 1 tablet (0 5 mg total) by mouth 2 (two) times a day as needed for anxiety 30 tablet 1    ALYACEN 1/35 1-35 MG-MCG per tablet Take 1 tablet by mouth daily 84 tablet 0    amitriptyline (ELAVIL) 50 mg tablet Take 1 tablet by mouth daily at bedtime      atenolol (TENORMIN) 25 mg tablet TAKE 1/2 TABLET TWICE A DAY 90 tablet 0    Calcium Carbonate-Vitamin D (CALCIUM-D PO) Take by mouth      Cholecalciferol (VITAMIN D3) 2000 units capsule Take 1 capsule by mouth daily      cyanocobalamin 1,000 mcg/mL INJECT 1 ML INTRAMUSCULARLY ONCE A MONTH 3 mL 0    multivitamin (THERAGRAN) TABS Take 1 tablet by mouth daily      Omega-3 Fatty Acids (OMEGA-3 FISH OIL) 1000 MG CAPS Take by mouth      SYRINGE-NEEDLE, DISP, 3 ML (B-D SYRINGE/NEEDLE 3CC/25GX5/8) 25G X 5/8" 3 ML MISC Use as directed for vitamin B12 injections 12 each 2    SYRINGE-NEEDLE, DISP, 3 ML (LUER LOCK SAFETY SYRINGES) 23G X 1" 3 ML MISC by Does not apply route every 30 (thirty) days 12 each 0    traMADol (ULTRAM) 50 mg tablet Take 50 mg by mouth 4 (four) times a day    3    albuterol (Ventolin HFA) 90 mcg/act inhaler Inhale 2 puffs every 6 (six) hours as needed for wheezing 18 g 0    azithromycin (ZITHROMAX) 250 mg tablet Take 2 tabs first day and 1 tab for days 2-5 6 tablet 0    benzonatate (TESSALON) 200 MG capsule Take 1 capsule (200 mg total) by mouth 3 (three) times a day as needed for cough 30 capsule 0    norgestrel-ethinyl estradiol ([place]ryselle-28) 0 3 mg-30 mcg per tablet Take 1 tablet by mouth daily (Patient not taking: Reported on 3/16/2020) 90 tablet 1     No current facility-administered medications for this visit            Health Maintenance     Health Maintenance   Topic Date Due    HIV Screening  09/16/2004    Influenza Vaccine 07/01/2019    Annual Physical  01/21/2020    Cervical Cancer Screening  08/15/2020    Depression Screening PHQ  03/16/2021    BMI: Adult  03/16/2021    DTaP,Tdap,and Td Vaccines (2 - Td) 03/28/2027    Pneumococcal Vaccine: 65+ Years (1 of 2 - PCV13) 09/16/2054    Pneumococcal Vaccine: Pediatrics (0 to 5 Years) and At-Risk Patients (6 to 59 Years)  Aged Out    HIB Vaccine  Aged Out    Hepatitis B Vaccine  Aged Out    IPV Vaccine  Aged Out    Hepatitis A Vaccine  Aged Out    Meningococcal ACWY Vaccine  Aged Out    HPV Vaccine  Aged [employer] History   Administered Date(s) Administered    Tdap 03/28/2017       Phoenix Martinez MD

## 2020-03-17 PROBLEM — J32.9 SINUSITIS: Status: ACTIVE | Noted: 2020-03-17

## 2020-04-09 ENCOUNTER — TELEMEDICINE (OUTPATIENT)
Dept: FAMILY MEDICINE CLINIC | Facility: CLINIC | Age: 30
End: 2020-04-09
Payer: COMMERCIAL

## 2020-04-09 VITALS — HEIGHT: 69 IN | WEIGHT: 166 LBS | TEMPERATURE: 98.5 F | BODY MASS INDEX: 24.59 KG/M2

## 2020-04-09 DIAGNOSIS — Z90.3 POSTGASTRECTOMY MALABSORPTION: ICD-10-CM

## 2020-04-09 DIAGNOSIS — F41.1 GAD (GENERALIZED ANXIETY DISORDER): Primary | ICD-10-CM

## 2020-04-09 DIAGNOSIS — K91.2 POSTGASTRECTOMY MALABSORPTION: ICD-10-CM

## 2020-04-09 DIAGNOSIS — F41.0 PANIC ATTACKS: ICD-10-CM

## 2020-04-09 DIAGNOSIS — M79.7 FIBROMYALGIA: ICD-10-CM

## 2020-04-09 DIAGNOSIS — E53.8 B12 DEFICIENCY: ICD-10-CM

## 2020-04-09 DIAGNOSIS — E53.8 VITAMIN B12 DEFICIENCY: ICD-10-CM

## 2020-04-09 PROCEDURE — 99213 OFFICE O/P EST LOW 20 MIN: CPT | Performed by: FAMILY MEDICINE

## 2020-04-09 RX ORDER — ALPRAZOLAM 0.5 MG/1
0.5 TABLET ORAL 2 TIMES DAILY PRN
Qty: 30 TABLET | Refills: 3 | Status: SHIPPED | OUTPATIENT
Start: 2020-04-09 | End: 2020-06-24 | Stop reason: SDUPTHER

## 2020-04-09 RX ORDER — CYANOCOBALAMIN 1000 UG/ML
1000 INJECTION INTRAMUSCULAR; SUBCUTANEOUS
Qty: 12 ML | Refills: 2 | Status: SHIPPED | OUTPATIENT
Start: 2020-04-09 | End: 2020-08-06 | Stop reason: SDUPTHER

## 2020-04-12 ENCOUNTER — TELEPHONE (OUTPATIENT)
Dept: FAMILY MEDICINE CLINIC | Facility: CLINIC | Age: 30
End: 2020-04-12

## 2020-04-12 PROBLEM — Z90.3 POSTGASTRECTOMY MALABSORPTION: Status: ACTIVE | Noted: 2020-04-12

## 2020-04-12 PROBLEM — J32.9 SINUSITIS: Status: RESOLVED | Noted: 2020-03-17 | Resolved: 2020-04-12

## 2020-04-12 PROBLEM — R06.2 WHEEZING: Status: RESOLVED | Noted: 2019-02-12 | Resolved: 2020-04-12

## 2020-04-12 PROBLEM — K91.2 POSTGASTRECTOMY MALABSORPTION: Status: ACTIVE | Noted: 2020-04-12

## 2020-04-12 PROBLEM — J06.9 UPPER RESPIRATORY TRACT INFECTION: Status: RESOLVED | Noted: 2019-02-12 | Resolved: 2020-04-12

## 2020-05-22 DIAGNOSIS — Z30.41 ENCOUNTER FOR SURVEILLANCE OF CONTRACEPTIVE PILLS: ICD-10-CM

## 2020-05-22 RX ORDER — NORETHINDRONE AND ETHINYL ESTRADIOL 1 MG-35MCG
KIT ORAL
Qty: 84 TABLET | Refills: 0 | Status: SHIPPED | OUTPATIENT
Start: 2020-05-22 | End: 2021-05-04

## 2020-06-02 LAB
25(OH)D3+25(OH)D2 SERPL-MCNC: 30 NG/ML (ref 30–100)
CHOLEST SERPL-MCNC: 161 MG/DL (ref 100–199)
FOLATE SERPL-MCNC: 17.9 NG/ML
HBA1C MFR BLD: 5 % (ref 4.8–5.6)
HDLC SERPL-MCNC: 51 MG/DL
INR PPP: 1 (ref 0.8–1.2)
IRON SATN MFR SERPL: 34 % (ref 15–55)
IRON SERPL-MCNC: 153 UG/DL (ref 27–159)
LDLC SERPL CALC-MCNC: 67 MG/DL (ref 0–99)
LDLC/HDLC SERPL: 1.3 RATIO (ref 0–3.2)
PROTHROMBIN TIME: 10.4 SEC (ref 9.1–12)
SL AMB VLDL CHOLESTEROL CALC: 43 MG/DL (ref 5–40)
TIBC SERPL-MCNC: 453 UG/DL (ref 250–450)
TRIGL SERPL-MCNC: 217 MG/DL (ref 0–149)
TSH SERPL DL<=0.005 MIU/L-ACNC: 3.71 UIU/ML (ref 0.45–4.5)
UIBC SERPL-MCNC: 300 UG/DL (ref 131–425)
VIT A SERPL-MCNC: 64.4 UG/DL (ref 18.9–57.3)
VIT B1 BLD-SCNC: 92.4 NMOL/L (ref 66.5–200)
VIT B12 SERPL-MCNC: 459 PG/ML (ref 232–1245)
VIT B2 BLD-MCNC: 182 UG/L (ref 137–370)

## 2020-06-10 ENCOUNTER — ANNUAL EXAM (OUTPATIENT)
Dept: OBGYN CLINIC | Facility: CLINIC | Age: 30
End: 2020-06-10
Payer: COMMERCIAL

## 2020-06-10 VITALS
SYSTOLIC BLOOD PRESSURE: 126 MMHG | BODY MASS INDEX: 24.88 KG/M2 | WEIGHT: 168 LBS | DIASTOLIC BLOOD PRESSURE: 82 MMHG | HEIGHT: 69 IN | TEMPERATURE: 96.9 F

## 2020-06-10 DIAGNOSIS — Z01.419 ROUTINE GYNECOLOGICAL EXAMINATION: Primary | ICD-10-CM

## 2020-06-10 PROCEDURE — G0145 SCR C/V CYTO,THINLAYER,RESCR: HCPCS | Performed by: PHYSICIAN ASSISTANT

## 2020-06-10 PROCEDURE — 99395 PREV VISIT EST AGE 18-39: CPT | Performed by: PHYSICIAN ASSISTANT

## 2020-06-10 PROCEDURE — 3008F BODY MASS INDEX DOCD: CPT | Performed by: PHYSICIAN ASSISTANT

## 2020-06-10 PROCEDURE — 87624 HPV HI-RISK TYP POOLED RSLT: CPT | Performed by: PHYSICIAN ASSISTANT

## 2020-06-13 LAB
HPV HR 12 DNA CVX QL NAA+PROBE: POSITIVE
HPV16 DNA CVX QL NAA+PROBE: NEGATIVE
HPV18 DNA CVX QL NAA+PROBE: NEGATIVE

## 2020-06-21 LAB
LAB AP GYN PRIMARY INTERPRETATION: NORMAL
Lab: NORMAL

## 2020-06-24 DIAGNOSIS — F41.1 GAD (GENERALIZED ANXIETY DISORDER): ICD-10-CM

## 2020-06-26 RX ORDER — ALPRAZOLAM 0.5 MG/1
0.5 TABLET ORAL 2 TIMES DAILY PRN
Qty: 30 TABLET | Refills: 3 | Status: SHIPPED | OUTPATIENT
Start: 2020-06-26 | End: 2020-09-04 | Stop reason: SDUPTHER

## 2020-06-29 ENCOUNTER — TELEPHONE (OUTPATIENT)
Dept: FAMILY MEDICINE CLINIC | Facility: CLINIC | Age: 30
End: 2020-06-29

## 2020-06-29 DIAGNOSIS — Z90.3 POSTGASTRECTOMY MALABSORPTION: Primary | ICD-10-CM

## 2020-06-29 DIAGNOSIS — K91.2 POSTGASTRECTOMY MALABSORPTION: Primary | ICD-10-CM

## 2020-07-21 ENCOUNTER — TELEPHONE (OUTPATIENT)
Dept: FAMILY MEDICINE CLINIC | Facility: CLINIC | Age: 30
End: 2020-07-21

## 2020-07-21 DIAGNOSIS — M79.7 FIBROMYALGIA: ICD-10-CM

## 2020-07-21 DIAGNOSIS — E53.8 VITAMIN B12 DEFICIENCY: Primary | ICD-10-CM

## 2020-07-21 DIAGNOSIS — Z90.3 POSTGASTRECTOMY MALABSORPTION: ICD-10-CM

## 2020-07-21 DIAGNOSIS — K91.2 POSTGASTRECTOMY MALABSORPTION: ICD-10-CM

## 2020-08-01 LAB
T4 FREE SERPL-MCNC: 1.28 NG/DL (ref 0.82–1.77)
TSH SERPL DL<=0.005 MIU/L-ACNC: 1.93 UIU/ML (ref 0.45–4.5)
VIT A SERPL-MCNC: 73 UG/DL (ref 18.9–57.3)
VIT B12 SERPL-MCNC: 477 PG/ML (ref 232–1245)

## 2020-08-04 ENCOUNTER — TELEPHONE (OUTPATIENT)
Dept: FAMILY MEDICINE CLINIC | Facility: CLINIC | Age: 30
End: 2020-08-04

## 2020-08-04 DIAGNOSIS — K91.2 POSTGASTRECTOMY MALABSORPTION: Primary | ICD-10-CM

## 2020-08-04 DIAGNOSIS — E53.8 VITAMIN B12 DEFICIENCY: ICD-10-CM

## 2020-08-04 DIAGNOSIS — E53.8 B12 DEFICIENCY: ICD-10-CM

## 2020-08-04 DIAGNOSIS — Z90.3 POSTGASTRECTOMY MALABSORPTION: Primary | ICD-10-CM

## 2020-08-04 NOTE — TELEPHONE ENCOUNTER
Please contact patient regarding blood work results  · Her thyroid test is normal  · Vitamin B12 is on the lower side of normal, please advise her to increased frequency of vitamin B12 injections to every 2 weeks x4 times then go back to vitamin B12 injections on a monthly basis  · Vitamin-A level is still elevated, as a matter of fact it is higher than before  Plan  1 It is very important that patient checks all her current multivitamins and eliminates any multivitamin containing vitamin-A    2  Patient should stop using fish oil capsules as they also contain vitamin-A    3  Please advised patient to review list of food which is high vitamin-A including milk, dairy, cheese, fish, beef liver, commercial cereal, carrots, broccoli, squash, cantaloupe    I recommend to repeat level of vitamin A and vitamin B12 in 2 months, orders printed, please mail to patient    Thank you

## 2020-08-04 NOTE — TELEPHONE ENCOUNTER
Pt is requesting for more vials to be sent to her pharmacy since she is to increase how often she is injecting  Please advise  Thank you!

## 2020-08-06 RX ORDER — NEEDLES, SAFETY 22GX1 1/2"
NEEDLE, DISPOSABLE MISCELLANEOUS
Qty: 6 EACH | Refills: 2 | Status: SHIPPED | OUTPATIENT
Start: 2020-08-06 | End: 2020-11-02 | Stop reason: SDUPTHER

## 2020-08-06 RX ORDER — CYANOCOBALAMIN 1000 UG/ML
1000 INJECTION INTRAMUSCULAR; SUBCUTANEOUS
Qty: 6 ML | Refills: 2 | Status: SHIPPED | OUTPATIENT
Start: 2020-08-06 | End: 2020-11-02 | Stop reason: SDUPTHER

## 2020-08-06 NOTE — TELEPHONE ENCOUNTER
"SUBJECTIVE:   Katty Robles is a 8 year old female who presents to clinic today with mother and sibling because of:    Chief Complaint   Patient presents with     Nose Problem     Bump in the nose.        HPI  ENT/ Bump in the nose.  A little bite of nose bleeding.  Problem started: 2 weeks ago  Fever: no  Runny nose: no  Congestion: Yes in the morning.  Sore Throat: no  Cough: no  Eye discharge/redness:  no  Ear Pain: no  Wheeze: no   Sick contacts: None;  Strep exposure: None;  Therapies Tried: None         ROS  Constitutional, eye, ENT, skin, respiratory, cardiac, and GI are normal except as otherwise noted.    PROBLEM LIST  Patient Active Problem List    Diagnosis Date Noted     Delayed recovery from anesthesia 08/19/2015     Priority: Medium     Ranula of floor of mouth 12/03/2014     Priority: Medium     Atopic dermatitis 10/29/2010     Priority: Medium      MEDICATIONS  Current Outpatient Prescriptions   Medication Sig Dispense Refill     Pediatric Multivit-Minerals-C (CHILDRENS MULTIVITAMIN PO) Take 1 tablet by mouth daily       VITAMIN D, CHOLECALCIFEROL, PO Take 1,000 Units by mouth daily         ALLERGIES  No Known Allergies    Reviewed and updated as needed this visit by clinical staff  Allergies  Meds  Med Hx  Surg Hx  Fam Hx         Reviewed and updated as needed this visit by Provider       OBJECTIVE:     /63 (BP Location: Right arm, Patient Position: Chair, Cuff Size: Child)  Pulse 99  Temp 98.7  F (37.1  C) (Oral)  Resp 22  Ht 4' 4\" (1.321 m)  Wt 83 lb 12.8 oz (38 kg)  SpO2 100%  BMI 21.79 kg/m2  43 %ile based on CDC 2-20 Years stature-for-age data using vitals from 10/29/2018.  90 %ile based on CDC 2-20 Years weight-for-age data using vitals from 10/29/2018.  95 %ile based on CDC 2-20 Years BMI-for-age data using vitals from 10/29/2018.  Blood pressure percentiles are 78.9 % systolic and 61.9 % diastolic based on the August 2017 AAP Clinical Practice Guideline.    GENERAL: " I did send updated prescriptions for vitamin B12 and syringes  Prescription states to use it every 2 weeks    Patient will follow my actual instructions to do it every 2 weeks x4 and then go back to every 3-4 weeks    Thank you Active, alert, in no acute distress.  SKIN: Clear. No significant rash, abnormal pigmentation or lesions  HEAD: Normocephalic.  EYES:  No discharge or erythema. Normal pupils and EOM.  EARS: Normal canals. Tympanic membranes are normal; gray and translucent.  NOSE: inflamed polyp left nostril with slight bleeding   MOUTH/THROAT: Clear. No oral lesions. Teeth intact without obvious abnormalities.  NECK: Supple, no masses.  LYMPH NODES: No adenopathy  LUNGS: Clear. No rales, rhonchi, wheezing or retractions  HEART: Regular rhythm. Normal S1/S2. No murmurs.  ABDOMEN: Soft, non-tender, not distended, no masses or hepatosplenomegaly. Bowel sounds normal.     DIAGNOSTICS: None    ASSESSMENT/PLAN:   (J33.9) Nasal polyp  (primary encounter diagnosis)    Plan: OTOLARYNGOLOGY REFERRAL, mupirocin (BACTROBAN)         2 % ointment                  Parker Desir MD

## 2020-08-10 DIAGNOSIS — Z01.419 ROUTINE GYNECOLOGICAL EXAMINATION: ICD-10-CM

## 2020-09-04 DIAGNOSIS — F41.1 GAD (GENERALIZED ANXIETY DISORDER): ICD-10-CM

## 2020-09-06 RX ORDER — ALPRAZOLAM 0.5 MG/1
0.5 TABLET ORAL 2 TIMES DAILY PRN
Qty: 30 TABLET | Refills: 2 | Status: SHIPPED | OUTPATIENT
Start: 2020-09-06 | End: 2020-10-15 | Stop reason: SDUPTHER

## 2020-10-15 DIAGNOSIS — F41.1 GAD (GENERALIZED ANXIETY DISORDER): ICD-10-CM

## 2020-10-15 DIAGNOSIS — M79.7 FIBROMYALGIA: Primary | ICD-10-CM

## 2020-10-18 RX ORDER — NALOXONE HYDROCHLORIDE 4 MG/.1ML
SPRAY NASAL
Qty: 1 EACH | Refills: 1 | Status: SHIPPED | OUTPATIENT
Start: 2020-10-18 | End: 2021-03-26

## 2020-10-18 RX ORDER — ALPRAZOLAM 0.5 MG/1
0.5 TABLET ORAL DAILY PRN
Qty: 30 TABLET | Refills: 2 | Status: SHIPPED | OUTPATIENT
Start: 2020-10-18 | End: 2021-01-28 | Stop reason: SDUPTHER

## 2020-11-02 DIAGNOSIS — E53.8 B12 DEFICIENCY: ICD-10-CM

## 2020-11-02 DIAGNOSIS — E53.8 VITAMIN B12 DEFICIENCY: ICD-10-CM

## 2020-11-02 RX ORDER — NEEDLES, SAFETY 22GX1 1/2"
NEEDLE, DISPOSABLE MISCELLANEOUS
Qty: 6 EACH | Refills: 0 | Status: SHIPPED | OUTPATIENT
Start: 2020-11-02 | End: 2021-01-28 | Stop reason: SDUPTHER

## 2020-11-02 RX ORDER — CYANOCOBALAMIN 1000 UG/ML
1000 INJECTION INTRAMUSCULAR; SUBCUTANEOUS
Qty: 6 ML | Refills: 0 | Status: SHIPPED | OUTPATIENT
Start: 2020-11-02 | End: 2021-02-02 | Stop reason: SDUPTHER

## 2020-11-05 DIAGNOSIS — Z01.419 ROUTINE GYNECOLOGICAL EXAMINATION: ICD-10-CM

## 2020-11-05 LAB
VIT A SERPL-MCNC: 50.2 UG/DL (ref 18.9–57.3)
VIT B12 SERPL-MCNC: 551 PG/ML (ref 232–1245)

## 2021-01-28 DIAGNOSIS — E53.8 B12 DEFICIENCY: ICD-10-CM

## 2021-01-28 DIAGNOSIS — F41.1 GAD (GENERALIZED ANXIETY DISORDER): ICD-10-CM

## 2021-01-28 RX ORDER — ALPRAZOLAM 0.5 MG/1
0.5 TABLET ORAL DAILY PRN
Qty: 30 TABLET | Refills: 2 | Status: SHIPPED | OUTPATIENT
Start: 2021-01-28 | End: 2021-04-21 | Stop reason: SDUPTHER

## 2021-01-28 RX ORDER — NEEDLES, SAFETY 22GX1 1/2"
NEEDLE, DISPOSABLE MISCELLANEOUS
Qty: 6 EACH | Refills: 5 | Status: SHIPPED | OUTPATIENT
Start: 2021-01-28 | End: 2021-05-04

## 2021-02-02 DIAGNOSIS — E53.8 VITAMIN B12 DEFICIENCY: ICD-10-CM

## 2021-02-03 RX ORDER — CYANOCOBALAMIN 1000 UG/ML
1000 INJECTION INTRAMUSCULAR; SUBCUTANEOUS
Qty: 6 ML | Refills: 5 | Status: SHIPPED | OUTPATIENT
Start: 2021-02-03 | End: 2022-04-20

## 2021-03-26 ENCOUNTER — OFFICE VISIT (OUTPATIENT)
Dept: FAMILY MEDICINE CLINIC | Facility: CLINIC | Age: 31
End: 2021-03-26
Payer: COMMERCIAL

## 2021-03-26 VITALS
HEART RATE: 104 BPM | OXYGEN SATURATION: 96 % | TEMPERATURE: 98.2 F | HEIGHT: 69 IN | BODY MASS INDEX: 25.36 KG/M2 | WEIGHT: 171.2 LBS | DIASTOLIC BLOOD PRESSURE: 82 MMHG | RESPIRATION RATE: 15 BRPM | SYSTOLIC BLOOD PRESSURE: 124 MMHG

## 2021-03-26 DIAGNOSIS — Z23 NEED FOR MENACTRA VACCINATION: ICD-10-CM

## 2021-03-26 DIAGNOSIS — M79.7 FIBROMYALGIA: ICD-10-CM

## 2021-03-26 DIAGNOSIS — F41.1 GAD (GENERALIZED ANXIETY DISORDER): ICD-10-CM

## 2021-03-26 DIAGNOSIS — Z90.3 POSTGASTRECTOMY MALABSORPTION: ICD-10-CM

## 2021-03-26 DIAGNOSIS — K91.2 POSTGASTRECTOMY MALABSORPTION: ICD-10-CM

## 2021-03-26 DIAGNOSIS — Z00.00 ENCOUNTER FOR HEALTH MAINTENANCE EXAMINATION IN ADULT: Primary | ICD-10-CM

## 2021-03-26 DIAGNOSIS — I73.00 RAYNAUD'S PHENOMENON WITHOUT GANGRENE: ICD-10-CM

## 2021-03-26 DIAGNOSIS — Z01.84 IMMUNITY STATUS TESTING: ICD-10-CM

## 2021-03-26 DIAGNOSIS — Z11.1 SCREENING FOR TUBERCULOSIS: ICD-10-CM

## 2021-03-26 PROCEDURE — 90471 IMMUNIZATION ADMIN: CPT

## 2021-03-26 PROCEDURE — 99395 PREV VISIT EST AGE 18-39: CPT | Performed by: FAMILY MEDICINE

## 2021-03-26 PROCEDURE — 90734 MENACWYD/MENACWYCRM VACC IM: CPT

## 2021-03-26 RX ORDER — CELECOXIB 200 MG/1
200 CAPSULE ORAL DAILY
COMMUNITY
Start: 2021-03-24

## 2021-03-26 RX ORDER — NIFEDIPINE 10 MG/1
10 CAPSULE ORAL 3 TIMES DAILY PRN
Qty: 90 CAPSULE | Refills: 1 | Status: SHIPPED | OUTPATIENT
Start: 2021-03-26 | End: 2021-05-04

## 2021-03-26 RX ORDER — PNV NO.95/FERROUS FUM/FOLIC AC 28MG-0.8MG
TABLET ORAL
COMMUNITY

## 2021-03-26 NOTE — PROGRESS NOTES
FAMILY PRACTICE OFFICE VISIT       NAME: Henna Barksdale  AGE: 31 y o  SEX: female       : 1990        MRN: [id number]        Assessment and Plan     Problem List Items Addressed This Visit        Digestive    Postgastrectomy malabsorption     · Patient  continues on regimen of vitamin, oral iron daily, vitamin D3 2000 units a day and vitamin B12 injections            Other    Fibromyalgia     ·  Patient remains under care of rheumatology, Dr Renard Villalta  ·  she is on regimen of Elavil 50 mg q h s  and tramadol p r n ,Rx by Rheumatology  VANITA (generalized anxiety disorder)     · Patient remains on amitriptyline 50 mg q h s  as per rheumatology  · She has tried numerous SSRIs and SNRIs with side effects  · Patient remains on p r n  Xanax with good results  · No history of misuse           Other Visit Diagnoses     Encounter for health maintenance examination in adult    -  Primary    Raynaud's phenomenon without gangrene        Relevant Medications    NIFEdipine (PROCARDIA) 10 mg capsule    Immunity status testing        Relevant Orders    Rubeola antibody IgG    Rubella antibody, IgG    Varicella zoster antibody, IgG    Mumps antibody, IgG    Hepatitis B surface antibody    Screening for tuberculosis        Relevant Orders    Quantiferon TB Gold Plus    Need for Menactra vaccination        Relevant Orders    MENINGOCOCCAL CONJUGATE VACCINE MCV4P IM (Completed)      Patient presents for annual well exam   Assessment and plan as outlined above  Patient follows healthy diet, exercises regularly  She remains under ongoing care of [facility] and will be discuss in [facility] at her forthcoming appointment this summer  She will proceed with blood work to assess immunity titers  We willl request paper chart to inquire about previous varicella vaccination dates  Patient will continue close follow-up with her rheumatologist for treatment of fibromyalgia      I suspect that she is exhibiting symptoms of Raynaud's  Patient had extensive connective tissue workup in the past and I will defer further evaluation of this problem to her rheumatology if warranted  For the time being, I suggested trial of Procardia 10 mg t i d  p r n  If patient tolerates medication well with no side effects of orthostatic lightheadedness - will consider extended release Procardia 30 mg daily  Menactra was administered today  BMI Counseling: Body mass index is 25 28 kg/m²  The BMI is above normal  Nutrition recommendations include encouraging healthy choices of fruits and vegetables, consuming healthier snacks, moderation in carbohydrate intake and reducing intake of cholesterol  Exercise recommendations include exercising 3-5 times per week  There are no Patient Instructions on file for this visit  Discussed with the patient and all questioned fully answered  She will call me if any problems arise  M*Cell[employer] software was used to dictate this note  It may contain errors with dictating incorrect words/spelling  Please contact provider directly with any questions  Chief Complaint     Chief Complaint   Patient presents with    Annual Exam     Arsenio Mott and Vaccines        History of Present Illness     Patient presents for annual well exam     She is starting clinical rotations and optometry school and needs up-to-date physical immunization review  Patient will be proceeding with titers to establish immunity for measles, mumps, rubella and varicella  She is up-to-date with Tdap  Menactra will be administered today  Patient remains under care of rheumatology for treatment of fibromyalgia  She remains on amitriptyline and tramadol followed by her rheumatologist, Dr Venancio Freed  Patient uses alprazolam on an as-needed basis for symptoms of anxiety  She uses medications sparingly, only as needed, no history of misuse    Patient has tried numerous SSRIs as well as Cymbalta and has developed side effects  Patient overall has been feeling well and denies symptoms of chest pain, palpitations, shortness of breath or dizziness  She is up-to-date with gyn  Patient is planning to discuss ParaGard at her next appointment with gyn  She has been on birth control pills but feels that combine oral contraceptives might be contributing to emotional lability symptoms  Patient is also concerned about decreased sex drive that could possibly be stress related as she is started clinical rotations at school and feels nervous and stressed  Patient has tried Nexplanon in the past and it has caused mood swings  Patient has been experiencing recurrent symptoms of purple discoloration of all her toes, especially in cold temperatures  Her fingers are not affected  Patient follows healthy diet and is trying to lose weight  She has started exercise routine with "[employer]  Review of Systems   Review of Systems   Constitutional: Negative  HENT: Negative  Eyes: Negative  Respiratory: Negative  Cardiovascular: Negative  Gastrointestinal: Negative  Endocrine: Negative  Genitourinary: Negative  Musculoskeletal: Positive for arthralgias and myalgias  Skin: Positive for color change ( toe discoloration, as outlined in HPI)  Allergic/Immunologic: Negative  Neurological: Negative  Hematological: Negative  Psychiatric/Behavioral: Negative for dysphoric mood  The patient is nervous/anxious          Active Problem List     Patient Active Problem List   Diagnosis    Fibromyalgia    Allergic rhinitis    Low vitamin D level    Panic attacks    Vitamin B12 deficiency    VANITA (generalized anxiety disorder)    Palpitations    Oligomenorrhea    Encounter for gynecological examination (general) (routine) without abnormal findings    Postgastrectomy malabsorption       Past Medical History:  Past Medical History:   Diagnosis Date    ADHD     Anxiety     Fibromyalgia     HPV (human papilloma virus) infection     early 25s    Ovarian cyst     PCOS (polycystic ovarian syndrome)     Vitamin B12 deficiency        Past Surgical History:  Past Surgical History:   Procedure Laterality Date    CHINTAN-EN-Y PROCEDURE  2009       Family History:  Family History   Problem Relation Age of Onset    Diabetes Mother     Breast cancer Mother 62        Invasive lobular carcinoma    Breast cancer Maternal Grandmother          mid 63's    Diabetes Maternal Grandmother     Lung cancer Maternal Grandmother     Hypertension Paternal Grandmother        Social History:  Social History     Socioeconomic History    Marital status: Single     Spouse name: Not on file    Number of children: Not on file    Years of education: Not on file    Highest education level: Not on file   Occupational History     Employer: [employer]  [address] Financial resource strain: Not on file    Food insecurity     Worry: Not on file     Inability: Not on file    Transportation needs     Medical: Not on file     Non-medical: Not on file   Tobacco Use    Smoking status: Never Smoker    Smokeless tobacco: Never Used   Substance and Sexual Activity    Alcohol use: No    Drug use: No    Sexual activity: Yes     Partners: Male     Birth control/protection: OCP   Lifestyle    Physical activity     Days per week: Not on file     Minutes per session: Not on file    Stress: Not on file   Relationships    Social connections     Talks on phone: Not on file     Gets together: Not on file     Attends Mosque service: Not on file     Active member of club or organization: Not on file     Attends meetings of clubs or organizations: Not on file     Relationship status: Not on file    Intimate partner violence     Fear of current or ex partner: Not on file     Emotionally abused: Not on file     Physically abused: Not on file     Forced sexual activity: Not on file   Other Topics Concern    Not on file   Social History Narrative    Not on file           Objective     Vitals:    03/26/21 1344 03/26/21 1439   BP: 134/88 124/82   BP Location: Left arm    Patient Position: Sitting    Cuff Size: Adult    Pulse: 104    Resp: 15    Temp: 98 2 °F (36 8 °C)    TempSrc: Temporal    SpO2: 96%    Weight: 77 7 kg (171 lb 3 2 oz)    Height: 5' 9" (1 753 m)      Wt Readings from Last 3 Encounters:   03/26/21 77 7 kg (171 lb 3 2 oz)   06/10/20 76 2 kg (168 lb)   04/09/20 75 3 kg (166 lb)       Physical Exam  Vitals signs and nursing note reviewed  Constitutional:       General: She is not in acute distress  Appearance: Normal appearance  She is well-developed  She is not ill-appearing  HENT:      Head: Normocephalic and atraumatic  Eyes:      Conjunctiva/sclera: Conjunctivae normal    Neck:      Musculoskeletal: Neck supple  Thyroid: No thyromegaly  Vascular: No carotid bruit  Cardiovascular:      Rate and Rhythm: Normal rate and regular rhythm  Heart sounds: Normal heart sounds  No murmur  Pulmonary:      Effort: Pulmonary effort is normal  No respiratory distress  Breath sounds: Normal breath sounds  No wheezing  Abdominal:      General: Bowel sounds are normal  There is no distension or abdominal bruit  Tenderness: There is no abdominal tenderness  Hernia: No hernia is present  Musculoskeletal: Normal range of motion  Right lower leg: No edema  Left lower leg: No edema  Skin:     Comments: Purplish discoloration of all toes  No pedal edema, normal pulses   Neurological:      Mental Status: She is alert and oriented to person, place, and time  Cranial Nerves: No cranial nerve deficit  Coordination: Coordination normal    Psychiatric:         Mood and Affect: Mood normal          Behavior: Behavior normal          Thought Content:  Thought content normal          Pertinent Laboratory/Diagnostic Studies:  Lab Results   Component Value Date    GLUCOSE 86 02/28/2016    BUN 10 02/28/2016    CREATININE 0 74 02/28/2016    CALCIUM 9 1 02/28/2016     02/28/2016    K 4 7 02/28/2016    CO2 24 02/28/2016    CL 98 02/28/2016     Lab Results   Component Value Date    ALT 13 02/28/2016    AST 16 02/28/2016    ALKPHOS 83 02/28/2016    BILITOT 0 5 02/28/2016       Lab Results   Component Value Date    WBC 5 0 02/28/2016    HGB 12 0 02/28/2016    HCT 36 6 02/28/2016    MCV 88 02/28/2016     02/28/2016       Lab Results   Component Value Date    TSH 1 930 07/29/2020       No results found for: CHOL  Lab Results   Component Value Date    TRIG 217 (H) 05/28/2020     Lab Results   Component Value Date    HDL 51 05/28/2020     Lab Results   Component Value Date    LDLCALC 67 05/28/2020     Lab Results   Component Value Date    HGBA1C 5 0 05/28/2020       Results for orders placed or performed in visit on 10/29/20   Vitamin A   Result Value Ref Range    Vitamin A 50 2 18 9 - 57 3 ug/dL   Vitamin B12   Result Value Ref Range    Vitamin B-12 551 232 - 1,245 pg/mL       Orders Placed This Encounter   Procedures    MENINGOCOCCAL CONJUGATE VACCINE MCV4P IM    Rubeola antibody IgG    Rubella antibody, IgG    Varicella zoster antibody, IgG    Mumps antibody, IgG    Hepatitis B surface antibody    Quantiferon TB Gold Plus       ALLERGIES:  Allergies   Allergen Reactions    Sulfa Antibiotics Rash    Propoxyphene Nausea Only       Current Medications     Current Outpatient Medications   Medication Sig Dispense Refill    ALPRAZolam (XANAX) 0 5 mg tablet Take 1 tablet (0 5 mg total) by mouth daily as needed for anxiety 30 tablet 2    amitriptyline (ELAVIL) 50 mg tablet Take 1 tablet by mouth daily at bedtime      Cholecalciferol (VITAMIN D3) 2000 units capsule Take 1 capsule by mouth daily      cyanocobalamin 1,000 mcg/mL Inject 1 mL (1,000 mcg total) into a muscle every 14 (fourteen) days 6 mL 5    Ferrous Sulfate (Iron) 325 (65 Fe) MG TABS Take by mouth      Multiple Minerals-Vitamins (PILAR-MAG-ZINC-D PO) Take by mouth daily      norgestrel-ethinyl estradiol (LO/OVRAL) 0 3 mg-30 mcg per tablet Take 1 tablet by mouth daily PATIENT TAKES CONTINUOUSLY, SKIPPING PLACEBO 112 tablet 2    traMADol (ULTRAM) 50 mg tablet Take 50 mg by mouth 4 (four) times a day    3    ALYACEN 1/35 1-35 MG-MCG per tablet TAKE 1 TABLET BY MOUTH EVERY DAY (Patient not taking: Reported on 3/26/2021) 84 tablet 0    Calcium Carbonate-Vitamin D (CALCIUM-D PO) Take by mouth      celecoxib (CeleBREX) 200 mg capsule Take 200 mg by mouth daily      multivitamin (THERAGRAN) TABS Take 1 tablet by mouth daily      NIFEdipine (PROCARDIA) 10 mg capsule Take 1 capsule (10 mg total) by mouth 3 (three) times a day as needed (Raynaud's symptoms) 90 capsule 1    Omega-3 Fatty Acids (OMEGA-3 FISH OIL) 1000 MG CAPS Take by mouth      SYRINGE-NEEDLE, DISP, 3 ML (B-D SYRINGE/NEEDLE 3CC/25GX5/8) 25G X 5/8" 3 ML MISC Use as directed for vitamin B12 injections every2 weeks (Patient not taking: Reported on 3/26/2021) 6 each 5     No current facility-administered medications for this visit          Medications Discontinued During This Encounter   Medication Reason    naloxone (NARCAN) 4 mg/0 1 mL nasal spray        Health Maintenance     Health Maintenance   Topic Date Due    HIV Screening  Never done    BMI: Followup Plan  Never done    Annual Physical  06/10/2021    Influenza Vaccine (1) 06/30/2021 (Originally 9/1/2020)    Depression Screening PHQ  03/26/2022    BMI: Adult  03/26/2022    Cervical Cancer Screening  06/10/2023    DTaP,Tdap,and Td Vaccines (2 - Td) 03/28/2027    Pneumococcal Vaccine: Pediatrics (0 to 5 Years) and At-Risk Patients (6 to 59 Years)  Aged Out    HIB Vaccine  Aged Out    Hepatitis B Vaccine  Aged Out    IPV Vaccine  Aged Out    Hepatitis A Vaccine  Aged Out    Meningococcal ACWY Vaccine  Aged Out    HPV Vaccine  Aged [employer] History   Administered Date(s) Administered    Meningococcal MCV4P 03/26/2021    Tdap 03/28/2017       Collin Zacarias MD

## 2021-04-04 PROBLEM — R05.9 COUGH: Status: RESOLVED | Noted: 2019-02-12 | Resolved: 2021-04-04

## 2021-04-04 NOTE — ASSESSMENT & PLAN NOTE
· Patient  continues on regimen of vitamin, oral iron daily, vitamin D3 2000 units a day and vitamin B12 injections

## 2021-04-04 NOTE — ASSESSMENT & PLAN NOTE
·  Patient remains under care of rheumatology, Dr Lucrecia Castro  ·  she is on regimen of Elavil 50 mg q h s  and tramadol p r n ,Rx by Rheumatology

## 2021-04-04 NOTE — ASSESSMENT & PLAN NOTE
· Patient remains on amitriptyline 50 mg q h s  as per rheumatology  · She has tried numerous SSRIs and SNRIs with side effects  · Patient remains on p r n  Xanax with good results      · No history of misuse

## 2021-04-07 ENCOUNTER — TELEPHONE (OUTPATIENT)
Dept: FAMILY MEDICINE CLINIC | Facility: CLINIC | Age: 31
End: 2021-04-07

## 2021-04-07 NOTE — TELEPHONE ENCOUNTER
Spoke with pt to triage message  Pt states that this was a one time incident  She did have her BP checked during the episode 130/60 then 15 minutes later 118/82 after being in supine position  Pt does have an Apple watch that did record the HR  She does not have the EKG feature on her watch  Pt did pay attention to the fact that she did not feel any flutters or skipping beats  Denies having CP during episode  Pt was asking for advise whether she would need to stop the Nifedipine as this was the newest medication she is taking

## 2021-04-07 NOTE — TELEPHONE ENCOUNTER
Please advise patient to discontinue nifedipine and let me know if  she has any recurrences of fast heart rate     thank you

## 2021-04-07 NOTE — TELEPHONE ENCOUNTER
----- Message from Sam Barksdale sent at 4/7/2021  9:26 AM EDT -----  Regarding: Non-Urgent Medical Question  Contact: [phone]  Good morning Dr Lauren Worrell,   I just wanted to update you on the Nifedipine  I had been taking it since my last visit without incident  However, yesterday I had an increased HR (175BPM) for about 20-25 minutes with flushing and dizziness  I know this can be a common side effect but I wanted to touch base with you on if it's okay to D/C the medicine  I normally wouldn't be as worried about it occurring but I had been at clinic so it was a little more bothersome  I know there may not be an alternative to this, but I just wanted to keep you updated on it

## 2021-04-07 NOTE — TELEPHONE ENCOUNTER
----- Message from Peter Carroll MD sent at 4/4/2021  7:48 AM EDT -----  Can we request old paper chart, please and thank you!!

## 2021-04-15 LAB
GAMMA INTERFERON BACKGROUND BLD IA-ACNC: 0.13 IU/ML
M TB IFN-G CD4+ T-CELLS BLD-ACNC: 0.09 IU/ML
M TB IFN-G CD4+ T-CELLS BLD-ACNC: 0.1 IU/ML
MITOGEN IGNF BLD-ACNC: >10 IU/ML
QUANTIFERON INCUBATION COMMENT: NORMAL
QUANTIFERON-TB GOLD PLUS: NEGATIVE
SERVICE CMNT-IMP: NORMAL

## 2021-04-21 DIAGNOSIS — F41.1 GAD (GENERALIZED ANXIETY DISORDER): ICD-10-CM

## 2021-04-22 RX ORDER — NALOXONE HYDROCHLORIDE 4 MG/.1ML
SPRAY NASAL
Qty: 1 EACH | Refills: 1 | Status: SHIPPED | OUTPATIENT
Start: 2021-04-22 | End: 2022-05-10

## 2021-04-22 RX ORDER — ALPRAZOLAM 0.5 MG/1
0.5 TABLET ORAL DAILY PRN
Qty: 30 TABLET | Refills: 2 | Status: SHIPPED | OUTPATIENT
Start: 2021-04-22 | End: 2021-07-22 | Stop reason: SDUPTHER

## 2021-05-03 NOTE — PROGRESS NOTES
Assessment/Plan   Diagnoses and all orders for this visit:    Well woman exam  -     Liquid-based pap, screening    Encounter for surveillance of contraceptive pills  -     norethindrone-ethinyl estradiol (NORTREL 1-35 TAB) 1-35 MG-MCG per tablet; Take 1 tablet by mouth daily Use continuously skipping placebo week as directed        Discussion    All questions have been answered to her satisfaction  RTO for APE or sooner if needed      Subjective     HPI   Edward Morfin is a 31 y o  female who presents for annual well woman exam      Menarche - ; LMP - 3/16/21; Periods are sporadic on OCPs  Tolerable when it comes lasts 3 days  Does c/o low libido and desires OCP d/c in the future  She plans to cont taking OCPs until the end of summer then likely desires IUD insertion  She currently takes pills continuously and gets minimal, if any bleeding  No vulvar itch/burn; No vaginal itch/burn; No abn discharge or odor; No urinary sx - burning/pain/frequency/hematuria    (+) SBEs - no breast masses, asymmetry, nipple discharge or bleeding, changes in skin of breast, or breast tenderness bilaterally    No abd/pelvic pain or HAs;       Pt is sexually active in a mutually monog/ sexual relationship; No issues with intercourse; She declines std/hiv/hep testing; Feels safe at home    Current contraception: OCPs    (+) PCP for routine Bw/care; Last Pap - 6/10/20 WNL pap, + non 16/18 HPV, no ECC  History of abnormal Pap smear: h/o abnormal pap and colpo years ago    Review of Systems   Constitutional: Negative  Respiratory: Negative  Gastrointestinal: Negative  Endocrine: Negative  Genitourinary: Negative          The following portions of the patient's history were reviewed and updated as appropriate: allergies, current medications, past family history, past medical history, past social history, past surgical history and problem list          OB History        0    Para   0    Term   0    0    AB   0    Living   0       SAB   0    TAB   0    Ectopic   0    Multiple   0    Live Births   0           Obstetric Comments   Menarche                Past Medical History:   Diagnosis Date    ADHD     Anxiety     Fibromyalgia     HPV (human papilloma virus) infection     early 25s    Ovarian cyst     PCOS (polycystic ovarian syndrome)     Vitamin B12 deficiency        Past Surgical History:   Procedure Laterality Date    CHINTAN-EN-Y PROCEDURE         Family History   Problem Relation Age of Onset    Diabetes Mother    [employer] Breast cancer Mother 62        Invasive lobular carcinoma    Breast cancer Maternal Grandmother          mid 63's    Diabetes Maternal Grandmother     Lung cancer Maternal Grandmother     Hypertension Paternal Grandmother        Social History     Socioeconomic History    Marital status: Single     Spouse name: Not on file    Number of children: Not on file    Years of education: Not on file    Highest education level: Not on file   Occupational History     Employer: [employer]  [address] [employer] Financial resource strain: Not on file    Food insecurity     Worry: Not on file     Inability: Not on file    Transportation needs     Medical: Not on file     Non-medical: Not on file   Tobacco Use    Smoking status: Never Smoker    Smokeless tobacco: Never Used   Substance and Sexual Activity    Alcohol use: No    Drug use: No    Sexual activity: Yes     Partners: Male     Birth control/protection: OCP   Lifestyle    Physical activity     Days per week: Not on file     Minutes per session: Not on file    Stress: Not on file   Relationships    Social connections     Talks on phone: Not on file     Gets together: Not on file     Attends Zoroastrian service: Not on file     Active member of club or organization: Not on file     Attends meetings of clubs or organizations: Not on file     Relationship status: Not on file    Intimate partner violence     Fear of current or ex partner: Not on file     Emotionally abused: Not on file     Physically abused: Not on file     Forced sexual activity: Not on file   Other Topics Concern    Not on file   Social History Narrative    Not on file         Current Outpatient Medications:     ALPRAZolam (XANAX) 0 5 mg tablet, Take 1 tablet (0 5 mg total) by mouth daily as needed for anxiety, Disp: 30 tablet, Rfl: 2    amitriptyline (ELAVIL) 50 mg tablet, Take 1 tablet by mouth daily at bedtime, Disp: , Rfl:     Calcium Carbonate-Vitamin D (CALCIUM-D PO), Take by mouth, Disp: , Rfl:     celecoxib (CeleBREX) 200 mg capsule, Take 200 mg by mouth daily, Disp: , Rfl:     Cholecalciferol (VITAMIN D3) 2000 units capsule, Take 1 capsule by mouth daily, Disp: , Rfl:     cyanocobalamin 1,000 mcg/mL, Inject 1 mL (1,000 mcg total) into a muscle every 14 (fourteen) days, Disp: 6 mL, Rfl: 5    Ferrous Sulfate (Iron) 325 (65 Fe) MG TABS, Take by mouth, Disp: , Rfl:     Multiple Minerals-Vitamins (PILAR-MAG-ZINC-D PO), Take by mouth daily, Disp: , Rfl:     traMADol (ULTRAM) 50 mg tablet, Take 50 mg by mouth 4 (four) times a day  , Disp: , Rfl: 3    naloxone (NARCAN) 4 mg/0 1 mL nasal spray, Administer 1 spray into a nostril  If no response after 2-3 minutes, give another dose in the other nostril using a new spray  (Patient not taking: Reported on 5/4/2021), Disp: 1 each, Rfl: 1    norethindrone-ethinyl estradiol (NORTREL 1-35 TAB) 1-35 MG-MCG per tablet, Take 1 tablet by mouth daily Use continuously skipping placebo week as directed, Disp: 84 tablet, Rfl: 4    Allergies   Allergen Reactions    Sulfa Antibiotics Rash    Propoxyphene Nausea Only       Objective   Vitals:    05/04/21 0811   BP: 132/94   BP Location: Left arm   Patient Position: Sitting   Cuff Size: Standard   Weight: 77 7 kg (171 lb 3 2 oz)   Height: 5' 9" (1 753 m)     Physical Exam  Constitutional:       Appearance: She is well-developed     HENT: Head: Normocephalic and atraumatic  Cardiovascular:      Rate and Rhythm: Normal rate and regular rhythm  Pulmonary:      Effort: Pulmonary effort is normal       Breath sounds: Normal breath sounds  Chest:      Breasts: Breasts are symmetrical          Right: No inverted nipple, mass, nipple discharge, skin change or tenderness  Left: No inverted nipple, mass, nipple discharge, skin change or tenderness  Abdominal:      General: There is no distension  Palpations: Abdomen is soft  There is no mass  Tenderness: There is no guarding or rebound  Genitourinary:     Exam position: Supine  Labia:         Right: No rash  Left: No rash  Vagina: No signs of injury and foreign body  No vaginal discharge or erythema  Cervix: No cervical motion tenderness  Adnexa:         Right: No mass  Left: No mass  Skin:     General: Skin is warm and dry  Neurological:      Mental Status: She is alert and oriented to person, place, and time  Patient Instructions   Pap done  Info given to pt to call insurnance to check coverage for Paragard  Will plan to call when desires insertion if covered  Aware will need to take placebo days and let herself have a withdrawal bleed and will then insert at end of menses  Aware if w h/o PCOS that pt has oligomenorrhea on PAragard, may need to reconsider hormonal BC in the future  Pt was given Cryselle by the pharmacy and desires to restart on the Alyacen  She did much better as far as moods and acne on the Alaycen, or Nortrel

## 2021-05-04 ENCOUNTER — ANNUAL EXAM (OUTPATIENT)
Dept: OBGYN CLINIC | Facility: CLINIC | Age: 31
End: 2021-05-04
Payer: COMMERCIAL

## 2021-05-04 VITALS
SYSTOLIC BLOOD PRESSURE: 132 MMHG | WEIGHT: 171.2 LBS | BODY MASS INDEX: 25.36 KG/M2 | HEIGHT: 69 IN | DIASTOLIC BLOOD PRESSURE: 94 MMHG

## 2021-05-04 DIAGNOSIS — Z01.419 WELL WOMAN EXAM: Primary | ICD-10-CM

## 2021-05-04 DIAGNOSIS — Z30.41 ENCOUNTER FOR SURVEILLANCE OF CONTRACEPTIVE PILLS: ICD-10-CM

## 2021-05-04 PROCEDURE — 3008F BODY MASS INDEX DOCD: CPT | Performed by: PHYSICIAN ASSISTANT

## 2021-05-04 PROCEDURE — 1036F TOBACCO NON-USER: CPT | Performed by: PHYSICIAN ASSISTANT

## 2021-05-04 PROCEDURE — G0145 SCR C/V CYTO,THINLAYER,RESCR: HCPCS | Performed by: PHYSICIAN ASSISTANT

## 2021-05-04 PROCEDURE — 99395 PREV VISIT EST AGE 18-39: CPT | Performed by: PHYSICIAN ASSISTANT

## 2021-05-04 PROCEDURE — 87624 HPV HI-RISK TYP POOLED RSLT: CPT | Performed by: PHYSICIAN ASSISTANT

## 2021-05-04 NOTE — PATIENT INSTRUCTIONS
Pap done  Info given to pt to call insurnance to check coverage for Paragard  Will plan to call when desires insertion if covered  Aware will need to take placebo days and let herself have a withdrawal bleed and will then insert at end of menses  Aware if w h/o PCOS that pt has oligomenorrhea on PAragard, may need to reconsider hormonal BC in the future  Pt was given Cryselle by the pharmacy and desires to restart on the Alyacen  She did much better as far as moods and acne on the Alaycen, or Nortrel

## 2021-05-10 LAB
LAB AP GYN PRIMARY INTERPRETATION: NORMAL
Lab: NORMAL

## 2021-07-20 DIAGNOSIS — Z90.3 POSTGASTRECTOMY MALABSORPTION: ICD-10-CM

## 2021-07-20 DIAGNOSIS — K91.2 POSTGASTRECTOMY MALABSORPTION: ICD-10-CM

## 2021-07-20 DIAGNOSIS — E53.8 VITAMIN B12 DEFICIENCY: Primary | ICD-10-CM

## 2021-07-20 NOTE — TELEPHONE ENCOUNTER
----- Message from Sam Barksdale sent at 7/20/2021  9:44 AM EDT -----  Regarding: Prescription Question  Contact: [phone]  Good Morning Dr Rodríguez Friday,     I hate to bother you but I went to do a refill request for the syringes for my B12 injections and didn't see it  Is it possible to have a refill for them sent to my pharmacy in [facility]? Thank you so much  Hope you are doing well!

## 2021-07-22 DIAGNOSIS — F41.1 GAD (GENERALIZED ANXIETY DISORDER): ICD-10-CM

## 2021-07-23 RX ORDER — ALPRAZOLAM 0.5 MG/1
0.5 TABLET ORAL DAILY PRN
Qty: 30 TABLET | Refills: 2 | Status: SHIPPED | OUTPATIENT
Start: 2021-07-23 | End: 2021-10-14 | Stop reason: SDUPTHER

## 2021-07-23 NOTE — TELEPHONE ENCOUNTER
Requested Prescriptions     Pending Prescriptions Disp Refills    ALPRAZolam (XANAX) 0 5 mg tablet 30 tablet 0     Sig: Take 1 tablet (0 5 mg total) by mouth daily as needed for anxiety       Please review and advise

## 2021-07-30 ENCOUNTER — TELEPHONE (OUTPATIENT)
Dept: OBGYN CLINIC | Facility: CLINIC | Age: 31
End: 2021-07-30

## 2021-07-30 NOTE — TELEPHONE ENCOUNTER
lmom for patient to remind her needs to schedule colpo (with sandra or doc)  If patient doesn't respond to call, send certified letter

## 2021-10-14 DIAGNOSIS — F41.1 GAD (GENERALIZED ANXIETY DISORDER): ICD-10-CM

## 2021-10-15 RX ORDER — ALPRAZOLAM 0.5 MG/1
0.5 TABLET ORAL DAILY PRN
Qty: 30 TABLET | Refills: 2 | Status: SHIPPED | OUTPATIENT
Start: 2021-10-15 | End: 2022-01-03 | Stop reason: SDUPTHER

## 2021-10-18 ENCOUNTER — TELEPHONE (OUTPATIENT)
Dept: OBGYN CLINIC | Facility: CLINIC | Age: 31
End: 2021-10-18

## 2022-01-03 DIAGNOSIS — F41.1 GAD (GENERALIZED ANXIETY DISORDER): ICD-10-CM

## 2022-01-04 RX ORDER — ALPRAZOLAM 0.5 MG/1
0.5 TABLET ORAL DAILY PRN
Qty: 30 TABLET | Refills: 2 | Status: SHIPPED | OUTPATIENT
Start: 2022-01-04 | End: 2022-03-17 | Stop reason: SDUPTHER

## 2022-03-17 DIAGNOSIS — F41.1 GAD (GENERALIZED ANXIETY DISORDER): ICD-10-CM

## 2022-03-18 RX ORDER — ALPRAZOLAM 0.5 MG/1
0.5 TABLET ORAL DAILY PRN
Qty: 30 TABLET | Refills: 2 | Status: SHIPPED | OUTPATIENT
Start: 2022-03-18 | End: 2022-06-05 | Stop reason: SDUPTHER

## 2022-04-20 DIAGNOSIS — E53.8 VITAMIN B12 DEFICIENCY: ICD-10-CM

## 2022-04-20 RX ORDER — CYANOCOBALAMIN 1000 UG/ML
INJECTION INTRAMUSCULAR; SUBCUTANEOUS
Qty: 6 ML | Refills: 5 | Status: SHIPPED | OUTPATIENT
Start: 2022-04-20 | End: 2022-07-07

## 2022-04-20 RX ORDER — CYANOCOBALAMIN 1000 UG/ML
INJECTION INTRAMUSCULAR; SUBCUTANEOUS
Qty: 6 ML | Refills: 5 | Status: SHIPPED | OUTPATIENT
Start: 2022-04-20 | End: 2022-04-20 | Stop reason: SDUPTHER

## 2022-05-06 RX ORDER — AMITRIPTYLINE HYDROCHLORIDE 75 MG/1
75 TABLET, FILM COATED ORAL
COMMUNITY
Start: 2022-03-31 | End: 2022-05-10

## 2022-05-06 RX ORDER — BIMATOPROST 3 UG/ML
SOLUTION TOPICAL
COMMUNITY
Start: 2022-02-26

## 2022-05-06 RX ORDER — SODIUM FLUORIDE 6 MG/ML
PASTE, DENTIFRICE DENTAL
COMMUNITY
Start: 2022-04-01

## 2022-05-10 ENCOUNTER — OFFICE VISIT (OUTPATIENT)
Dept: OBGYN CLINIC | Facility: CLINIC | Age: 32
End: 2022-05-10
Payer: COMMERCIAL

## 2022-05-10 ENCOUNTER — OFFICE VISIT (OUTPATIENT)
Dept: FAMILY MEDICINE CLINIC | Facility: CLINIC | Age: 32
End: 2022-05-10
Payer: COMMERCIAL

## 2022-05-10 VITALS
WEIGHT: 175 LBS | BODY MASS INDEX: 25.92 KG/M2 | SYSTOLIC BLOOD PRESSURE: 140 MMHG | DIASTOLIC BLOOD PRESSURE: 90 MMHG | HEIGHT: 69 IN

## 2022-05-10 VITALS
BODY MASS INDEX: 26.07 KG/M2 | OXYGEN SATURATION: 99 % | HEIGHT: 69 IN | TEMPERATURE: 98 F | DIASTOLIC BLOOD PRESSURE: 82 MMHG | WEIGHT: 176 LBS | HEART RATE: 97 BPM | SYSTOLIC BLOOD PRESSURE: 138 MMHG | RESPIRATION RATE: 16 BRPM

## 2022-05-10 DIAGNOSIS — K91.2 POSTGASTRECTOMY MALABSORPTION: ICD-10-CM

## 2022-05-10 DIAGNOSIS — M79.7 FIBROMYALGIA: Chronic | ICD-10-CM

## 2022-05-10 DIAGNOSIS — Z30.41 ENCOUNTER FOR SURVEILLANCE OF CONTRACEPTIVE PILLS: ICD-10-CM

## 2022-05-10 DIAGNOSIS — Z90.3 POSTGASTRECTOMY MALABSORPTION: ICD-10-CM

## 2022-05-10 DIAGNOSIS — Z01.419 GYNECOLOGIC EXAM NORMAL: Primary | ICD-10-CM

## 2022-05-10 DIAGNOSIS — R03.0 ELEVATED BLOOD PRESSURE, SITUATIONAL: ICD-10-CM

## 2022-05-10 DIAGNOSIS — Z00.00 ENCOUNTER FOR WELLNESS EXAMINATION IN ADULT: Primary | ICD-10-CM

## 2022-05-10 DIAGNOSIS — R79.89 LOW VITAMIN D LEVEL: ICD-10-CM

## 2022-05-10 DIAGNOSIS — F41.1 GAD (GENERALIZED ANXIETY DISORDER): ICD-10-CM

## 2022-05-10 PROCEDURE — 3725F SCREEN DEPRESSION PERFORMED: CPT | Performed by: FAMILY MEDICINE

## 2022-05-10 PROCEDURE — 99395 PREV VISIT EST AGE 18-39: CPT | Performed by: PHYSICIAN ASSISTANT

## 2022-05-10 PROCEDURE — 99395 PREV VISIT EST AGE 18-39: CPT | Performed by: FAMILY MEDICINE

## 2022-05-10 PROCEDURE — 1036F TOBACCO NON-USER: CPT | Performed by: PHYSICIAN ASSISTANT

## 2022-05-10 RX ORDER — BUSPIRONE HYDROCHLORIDE 5 MG/1
TABLET ORAL
Qty: 90 TABLET | Refills: 1 | Status: SHIPPED | OUTPATIENT
Start: 2022-05-10 | End: 2022-07-17 | Stop reason: SDUPTHER

## 2022-05-10 NOTE — ASSESSMENT & PLAN NOTE
Dr Ortega, Rheumatology, manages  Currently patient is on tramadol and amitriptyline 50 mg q h s      She will be discussing slow weaning of tramadol with her rheumatologist within next few months  Patient will benefit from regular physical activity, we specifically discussed swimming

## 2022-05-10 NOTE — PROGRESS NOTES
Assessment/Plan   Problem List Items Addressed This Visit        Other    Gynecologic exam normal - Primary     Pap guidelines reviewed  Pap with HPV done today  Repeat BP : 134/88  Reviewed concern of rising BP while on estrogen combined pill  Will continue to monitor BP and call if persistently elevated to discuss other birth control options  Return to office for annual or as needed  Relevant Orders    Thinprep Pap and HPV mRNA E6/E7 Reflex HPV 16,18/45      Other Visit Diagnoses     Encounter for surveillance of contraceptive pills        Relevant Medications    norethindrone-ethinyl estradiol (NORTREL 1-35 TAB) 1-35 MG-MCG per tablet          Subjective:     Patient ID: Percy Royal is a 27 y o  y o  female  HPI  26 yo seen for annual exam  Currently on Nortrel 1/35  She reports that she gets period once or twice a year  She has been on Nortrel OCP and has been tolerating well, would like to continue  She states her blood pressure is typically WNL, but was elevated at a different appointment last month  She attributes the elevation to being under a lot of stress with school  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  She states she will keep a close eye on her bp  She has an appointment with her PCP later today and will mention her elevated readings then  Patient also reports she underwent breast augmentation in Dec 2021  She denies breast masses, lesions or swelling  She states her incisions have healed well  Denies bowel or bladder issues  Last pap: 5/4/2021 NILM (+)HRHPV non 16, 18  6/10/2020 NILM (+)HRHPV non 16, 18  Was recommended to have colposcopy last year, patient had to reschedule due to busy schedule     The following portions of the patient's history were reviewed and updated as appropriate:   She  has a past medical history of ADHD, Anxiety, Fibromyalgia, HPV (human papilloma virus) infection, Ovarian cyst, PCOS (polycystic ovarian syndrome), and Vitamin B12 deficiency  She   Patient Active Problem List    Diagnosis Date Noted    Gynecologic exam normal 05/10/2022    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    Encounter for gynecological examination (general) (routine) without abnormal findings 01/21/2019    VANITA (generalized anxiety disorder) 08/13/2018    Palpitations 08/13/2018    Allergic rhinitis 10/03/2017    Low vitamin D level 02/16/2016    Panic attacks 02/16/2016    Vitamin B12 deficiency 02/16/2016    Oligomenorrhea 10/09/2015    Fibromyalgia 04/15/2013    ADHD 01/01/1999     She  has a past surgical history that includes Amina-en-y procedure (2009); Bariatric Surgery (03/09/2009); and AUGMENTATION BREAST  Her family history includes Breast cancer in her maternal grandmother; Breast cancer (age of onset: 62) in her mother; Diabetes in her maternal grandmother and mother; Hypertension in her paternal grandmother; Lung cancer in her maternal grandmother  She  reports that she has never smoked  She has never used smokeless tobacco  She reports that she does not drink alcohol and does not use drugs    Current Outpatient Medications   Medication Sig Dispense Refill    ALPRAZolam (XANAX) 0 5 mg tablet Take 1 tablet (0 5 mg total) by mouth daily as needed for anxiety 30 tablet 2    amitriptyline (ELAVIL) 50 mg tablet Take 1 tablet by mouth daily at bedtime      B-D 3CC LUER-SHANIQUA SYR 25GX5/8" 25G X 5/8" 3 ML MISC USE 1 SYRINGE INTRAMUSCULARLY (INTO THE MUSCLE) EVERY 14 DAYS FOR VITAMIN B12      bimatoprost (LATISSE) 0 03 % ophthalmic solution INSTILL 1 DROP INTO EACH EYE IN THE PM--WI SENT 1/18/21      Calcium Carbonate-Vitamin D (CALCIUM-D PO) Take by mouth      celecoxib (CeleBREX) 200 mg capsule Take 200 mg by mouth daily      Cholecalciferol (VITAMIN D3) 2000 units capsule Take 1 capsule by mouth daily      cyanocobalamin 1,000 mcg/mL INJECT 1 MILLILITER INTRAMUSCULARLY (INTO THE MUSCLE) EVERY 14 DAYS 6 mL 5    Ferrous Sulfate (Iron) 325 (65 Fe) MG TABS Take by mouth      Multiple Minerals-Vitamins (PILAR-MAG-ZINC-D PO) Take by mouth daily      Needles & Syringes MISC Use every 14 (fourteen) days BD Safety Glide-3ml 23G X1, Inject 1 ml of Vitamin B12 IM every 14 days  OK to substitute with covered formulary alternative  2 each 11    norethindrone-ethinyl estradiol (NORTREL 1-35 TAB) 1-35 MG-MCG per tablet Take 1 tablet by mouth daily Use continuously skipping placebo week as directed 84 tablet 4    PreviDent 5000 Booster Plus 1 1 % PSTE BRUSH WITH PRESCRIBED TOOTHPASTE BY MOUTH TWO TIMES DAILY INSTEAD OF REGULAR TOOTHPASTE  DO NOT RINSE AFTER USE      traMADol (ULTRAM) 50 mg tablet Take 50 mg by mouth 4 (four) times a day    3     No current facility-administered medications for this visit  She is allergic to sulfa antibiotics and propoxyphene       Menstrual History:  OB History        0    Para   0    Term   0       0    AB   0    Living   0       SAB   0    IAB   0    Ectopic   0    Multiple   0    Live Births   0           Obstetric Comments   Menarche   Patient's last menstrual period was 2021 (approximate)  Review of Systems   Constitutional: Negative for fatigue, fever and unexpected weight change  HENT: Negative for dental problem and sinus pressure  Eyes: Negative for visual disturbance  Respiratory: Negative for cough, shortness of breath and wheezing  Cardiovascular: Negative for chest pain  Gastrointestinal: Negative for abdominal pain, blood in stool, constipation, diarrhea, nausea and vomiting  Endocrine: Negative for polydipsia  Genitourinary: Negative for difficulty urinating, dyspareunia, dysuria, frequency, hematuria, pelvic pain and urgency  Musculoskeletal: Negative for arthralgias and back pain  Neurological: Negative for dizziness, seizures, light-headedness and headaches  Psychiatric/Behavioral: Negative for suicidal ideas  The patient is not nervous/anxious  Objective:  Vitals:    05/10/22 1016   BP: 140/90   BP Location: Left arm   Patient Position: Sitting   Cuff Size: Standard   Weight: 79 4 kg (175 lb)   Height: 5' 9" (1 753 m)      Physical Exam  Constitutional:       Appearance: Normal appearance  She is well-developed  Genitourinary:      Vulva and bladder normal       No lesions in the vagina  Right Labia: No rash, tenderness, lesions or skin changes  Left Labia: No tenderness, lesions, skin changes or rash  No labial fusion noted  No inguinal adenopathy present in the right or left side  No vaginal discharge, erythema, tenderness or bleeding  Right Adnexa: not tender, not full and no mass present  Left Adnexa: not tender, not full and no mass present  No cervical motion tenderness, discharge or lesion  Uterus is not enlarged, tender or irregular  No uterine mass detected  No urethral prolapse, tenderness or mass present  Bladder is not tender  Breasts: Breasts are symmetrical       Right: No swelling, bleeding, inverted nipple, mass, nipple discharge, skin change, tenderness, axillary adenopathy or supraclavicular adenopathy  Left: No swelling, bleeding, inverted nipple, mass, nipple discharge, skin change, tenderness, axillary adenopathy or supraclavicular adenopathy  HENT:      Head: Normocephalic and atraumatic  Neck:      Thyroid: No thyromegaly  Cardiovascular:      Rate and Rhythm: Normal rate and regular rhythm  Heart sounds: Normal heart sounds  No murmur heard  No friction rub  No gallop  Pulmonary:      Effort: Pulmonary effort is normal  No respiratory distress  Breath sounds: Normal breath sounds  No wheezing  Abdominal:      General: There is no distension  Palpations: Abdomen is soft  There is no mass  Tenderness: There is no abdominal tenderness  There is no guarding or rebound  Hernia: No hernia is present     Lymphadenopathy: Cervical: No cervical adenopathy  Upper Body:      Right upper body: No supraclavicular, axillary or pectoral adenopathy  Left upper body: No supraclavicular, axillary or pectoral adenopathy  Lower Body: No right inguinal adenopathy  No left inguinal adenopathy  Neurological:      Mental Status: She is alert and oriented to person, place, and time  Skin:     General: Skin is warm and dry     Psychiatric:         Behavior: Behavior normal

## 2022-05-10 NOTE — PROGRESS NOTES
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  Previous therapy with atenolol caused adverse side effects  Patient will try BuSpar p r n  daytime  Orders:  -     busPIRone (BUSPAR) 5 mg tablet; Take 1 tab twice a day for 7 days PRN : anxiety, OK  to increase dose afterwards to 10 mg BID    3  Fibromyalgia  Assessment & Plan:  Dr Ortega, Rheumatology, manages  Currently patient is on tramadol and amitriptyline 50 mg q h s  She will be discussing slow weaning of tramadol with her rheumatologist within next few months  Patient will benefit from regular physical activity, we specifically discussed swimming      4  Postgastrectomy malabsorption  Assessment & Plan:  Patient will proceed with complete blood work  She has been avoiding vitamin-A containing supplements due to previous elevated level of vitamin A  She remains on vitamin B12 injections at home every other week  Daily dose of vitamin D3 approximately 2000 units daily  Patient remains on iron 325 mg daily  Orders:  -     CBC and differential; Future  -     Comprehensive metabolic panel; Future  -     TSH, 3rd generation; Future  -     Vitamin B12; Future  -     Vitamin D 25 hydroxy; Future  -     Hemoglobin A1C; Future  -     Iron, TIBC and Ferritin Panel; Future  -     Vitamin A; Future    5  Low vitamin D level  Assessment & Plan:  Patient uses multivitamin with vitamin-D along with vitamin D3 1000 units daily  Proceed with blood work now  6  Elevated blood pressure, situational  Assessment & Plan:  Blood pressure is on the high side of normal     Patient admits to significant stress and anxiety which is likely contributing to borderline blood pressure during office visit today  We discussed importance of meditation, stress reduction, exercise, low-sodium low caffeine diet  Patient has an access to blood pressure cuff and will monitor blood pressure of side  Goal for BP to be below 130/80  She will contact me if her readings are not within the target range  BMI Counseling: Body mass index is 25 99 kg/m²  The BMI is above normal  Nutrition recommendations include encouraging healthy choices of fruits and vegetables, consuming healthier snacks, moderation in carbohydrate intake, reducing intake of saturated and trans fat and reducing intake of cholesterol  Exercise recommendations include exercising 3-5 times per week  No pharmacotherapy was ordered  Patient referred to PCP  Rationale for BMI follow-up plan is due to patient being overweight or obese  Depression Screening and Follow-up Plan: Patient was screened for depression during today's encounter  They screened negative with a PHQ-2 score of 0  There are no Patient Instructions on file for this visit  No follow-ups on file  Discussed with the patient and all questioned fully answered  She will call me if any problems arise  [employer] software was used to dictate this note  It may contain errors with dictating incorrect words/spelling  Please contact provider directly with any questions  Chief Complaint     Chief Complaint   Patient presents with    Physical Exam     yearly    Hypertension     elevated at GYN/rheumatology visit today    Anxiety     Situational        History of Present Illness     Patient presents for annual well exam     She was seen by gynecologist earlier today   Birth control pill works well, no concerns  Patient has completed all 3 doses of COVID-19 vaccination  Patient reports persistent symptoms of anxiety and stress  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  Patient uses alprazolam strictly p r n  as directed, frequently she takes only half a tablet, 0 25 mg occasionally up to 0 5 mg daily p r n  Levi Chavezin Chronic symptoms of fibromyalgia managed by Rheumatology  Patient remains on regimen of amitriptyline 50 mg q h s  and tramadol  Patient her rheumatologist have discussed eventual gradual weaning of tramadol therapy  Patient's sleep is up and down  She offers no complaints of chest pain or palpitations  She has tried atenolol in the past, it has caused decreased libido and symptoms of orthostasis  Patient is not a candidate for SSRI therapy since she remains on amitriptyline and tramadol  Patient remains on iron, vitamin-D and multivitamin daily  Patient has not been using any vitamin a containing supplements due to previous history of elevated vitamin a level  Patient is on vitamin B12 injections every other week  She is due for routine blood work for surveillance of post gastrectomy malabsorption  Hypertension  Associated symptoms include anxiety  Anxiety  Symptoms include nervous/anxious behavior  Review of Systems   Review of Systems   Constitutional: Negative  HENT: Negative  Eyes: Negative  Respiratory: Negative  Cardiovascular: Negative  Gastrointestinal: Negative  Endocrine: Negative  Genitourinary: Negative  Musculoskeletal: Positive for arthralgias and myalgias  Skin: Negative  Allergic/Immunologic: Negative  Neurological: Negative  Hematological: Negative  Psychiatric/Behavioral: Positive for sleep disturbance  The patient is nervous/anxious          Active Problem List     Patient Active Problem List   Diagnosis    Fibromyalgia    Allergic rhinitis    Low vitamin D level    Panic attacks    Vitamin B12 deficiency    VANITA (generalized anxiety disorder)    Palpitations    Oligomenorrhea    Encounter for gynecological examination (general) (routine) without abnormal findings    Postgastrectomy malabsorption    ADHD    Gynecologic exam normal    Elevated blood pressure, situational       Past Medical History:  Past Medical History:   Diagnosis Date    ADHD     Anxiety     Fibromyalgia     HPV (human papilloma virus) infection     early 25s    Ovarian cyst     PCOS (polycystic ovarian syndrome)     Vitamin B12 deficiency        Past Surgical History:  Past Surgical History:   Procedure Laterality Date    AUGMENTATION BREAST      BARIATRIC SURGERY  03/09/2009    CHINTAN-EN-Y PROCEDURE  2009       Family History:  Family History   Problem Relation Age of Onset    Diabetes Mother     Breast cancer Mother 62        Invasive lobular carcinoma    Breast cancer Maternal Grandmother          mid 63's    Diabetes Maternal Grandmother     Lung cancer Maternal Grandmother     Hypertension Paternal Grandmother        Social History:  Social History     Socioeconomic History    Marital status: /Civil Union     Spouse name: Not on file    Number of children: Not on file    Years of education: Not on file    Highest education level: Not on file   Occupational History     Employer: [employer]   Tobacco Use    Smoking status: Never Smoker    Smokeless tobacco: Never Used   Vaping Use    Vaping Use: Never used   Substance and Sexual Activity    Alcohol use: No    Drug use: No    Sexual activity: Yes     Partners: Male     Birth control/protection: OCP   Other Topics Concern    Not on file   Social History Narrative    Not on file     Social Determinants of Health     Financial Resource Strain: Not on file   Food Insecurity: Not on file   Transportation Needs: Not on file   Physical Activity: Not on file   Stress: Not on file   Social Connections: Not on file   Intimate Partner Violence: Not on file   Housing Stability: Not on file         Objective     Vitals:    05/10/22 1328 05/10/22 1437   BP: 140/90 138/82   BP Location: Left arm    Patient Position: Sitting    Cuff Size: Standard    Pulse: 97    Resp: 16    Temp: 98 °F (36 7 °C)    TempSrc: Temporal    SpO2: 99%    Weight: 79 8 kg (176 lb)    Height: 5' 9" (1 753 m)        Wt Readings from Last 3 Encounters:   05/10/22 79 8 kg (176 lb)   05/10/22 79 4 kg (175 lb)   05/04/21 77 7 kg (171 lb 3 2 oz)       Physical Exam  Vitals and nursing note reviewed  Constitutional:       General: She is not in acute distress  Appearance: Normal appearance  She is well-developed  She is not ill-appearing  HENT:      Head: Normocephalic and atraumatic  Eyes:      General: No scleral icterus  Conjunctiva/sclera: Conjunctivae normal    Neck:      Thyroid: No thyromegaly  Vascular: No carotid bruit  Cardiovascular:      Rate and Rhythm: Normal rate and regular rhythm  Heart sounds: Normal heart sounds  No murmur heard  Pulmonary:      Effort: Pulmonary effort is normal  No respiratory distress  Breath sounds: Normal breath sounds  No wheezing  Abdominal:      General: There is no distension or abdominal bruit  Palpations: Abdomen is soft  Tenderness: There is no abdominal tenderness  Musculoskeletal:         General: Normal range of motion  Cervical back: Neck supple  No rigidity  Right lower leg: No edema  Left lower leg: No edema  Skin:     General: Skin is warm  Findings: No rash  Neurological:      Mental Status: She is alert and oriented to person, place, and time  Cranial Nerves: No cranial nerve deficit  Coordination: Coordination normal    Psychiatric:         Mood and Affect: Mood normal          Behavior: Behavior normal          Thought Content:  Thought content normal           Pertinent Laboratory/Diagnostic Studies:    Lab Results Component Value Date    WBC 5 0 02/28/2016    HGB 12 0 02/28/2016    HCT 36 6 02/28/2016    MCV 88 02/28/2016     02/28/2016       Lab Results   Component Value Date    TSH 1 930 07/29/2020       No results found for: CHOL  Lab Results   Component Value Date    TRIG 217 (H) 05/28/2020     Lab Results   Component Value Date    HDL 51 05/28/2020     Lab Results   Component Value Date    LDLCALC 67 05/28/2020     Lab Results   Component Value Date    HGBA1C 5 0 05/28/2020     Lab Results   Component Value Date    K 4 7 02/28/2016    CL 98 02/28/2016    CO2 24 02/28/2016    BUN 10 02/28/2016    CREATININE 0 74 02/28/2016    CALCIUM 9 1 02/28/2016    AST 16 02/28/2016    ALT 13 02/28/2016    ALKPHOS 83 02/28/2016    PROT 7 1 02/28/2016    BILITOT 0 5 02/28/2016       Orders Placed This Encounter   Procedures    CBC and differential    Comprehensive metabolic panel    TSH, 3rd generation    Vitamin B12    Vitamin D 25 hydroxy    Hemoglobin A1C    Iron, TIBC and Ferritin Panel    Vitamin A       ALLERGIES:  Allergies   Allergen Reactions    Sulfa Antibiotics Rash    Propoxyphene Nausea Only       Current Medications     Current Outpatient Medications   Medication Sig Dispense Refill    ALPRAZolam (XANAX) 0 5 mg tablet Take 1 tablet (0 5 mg total) by mouth daily as needed for anxiety 30 tablet 2    amitriptyline (ELAVIL) 50 mg tablet Take 1 tablet by mouth daily at bedtime      B-D 3CC LUER-SHANIQUA SYR 25GX5/8" 25G X 5/8" 3 ML MISC USE 1 SYRINGE INTRAMUSCULARLY (INTO THE MUSCLE) EVERY 14 DAYS FOR VITAMIN B12      bimatoprost (LATISSE) 0 03 % ophthalmic solution INSTILL 1 DROP INTO EACH EYE IN THE PM--ND SENT 1/18/21      Calcium Carbonate-Vitamin D (CALCIUM-D PO) Take by mouth      celecoxib (CeleBREX) 200 mg capsule Take 200 mg by mouth daily      Cholecalciferol (VITAMIN D3) 2000 units capsule Take 1 capsule by mouth daily      cyanocobalamin 1,000 mcg/mL INJECT 1 MILLILITER INTRAMUSCULARLY (INTO THE MUSCLE) EVERY 14 DAYS 6 mL 5    Ferrous Sulfate (Iron) 325 (65 Fe) MG TABS Take by mouth      Multiple Minerals-Vitamins (PILAR-MAG-ZINC-D PO) Take by mouth daily      Needles & Syringes MISC Use every 14 (fourteen) days BD Safety Glide-3ml 23G X1, Inject 1 ml of Vitamin B12 IM every 14 days  OK to substitute with covered formulary alternative  2 each 11    norethindrone-ethinyl estradiol (NORTREL 1-35 TAB) 1-35 MG-MCG per tablet Take 1 tablet by mouth daily Use continuously skipping placebo week as directed 84 tablet 4    PreviDent 5000 Booster Plus 1 1 % PSTE BRUSH WITH PRESCRIBED TOOTHPASTE BY MOUTH TWO TIMES DAILY INSTEAD OF REGULAR TOOTHPASTE  DO NOT RINSE AFTER USE      traMADol (ULTRAM) 50 mg tablet Take 50 mg by mouth 4 (four) times a day    3    busPIRone (BUSPAR) 5 mg tablet Take 1 tab twice a day for 7 days PRN : anxiety, OK  to increase dose afterwards to 10 mg BID 90 tablet 1     No current facility-administered medications for this visit  There are no discontinued medications      Health Maintenance     Health Maintenance   Topic Date Due    Hepatitis C Screening  Never done    HIV Screening  Never done    DTaP,Tdap,and Td Vaccines (1 - Tdap) 03/28/2017    Influenza Vaccine (Season Ended) 09/01/2022    Depression Screening  05/10/2023    BMI: Adult  05/10/2023    Annual Physical  05/10/2023    BMI: Followup Plan  05/14/2023    Cervical Cancer Screening  05/04/2024    COVID-19 Vaccine  Completed    Pneumococcal Vaccine: Pediatrics (0 to 5 Years) and At-Risk Patients (6 to 59 Years)  Aged Out    HIB Vaccine  Aged Out    Hepatitis B Vaccine  Aged Out    IPV Vaccine  Aged Out    Hepatitis A Vaccine  Aged Out    Meningococcal ACWY Vaccine  Aged Out    HPV Vaccine  Aged [employer] History   Administered Date(s) Administered    COVID-19 MODERNA VACC 0 5 ML IM 01/26/2021, 02/25/2021, 11/12/2021    Meningococcal MCV4P 03/26/2021    Tdap 03/28/2017       Gerline Severs Tamara Watson MD

## 2022-05-10 NOTE — PROGRESS NOTES
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

## 2022-05-14 PROBLEM — R03.0 ELEVATED BLOOD PRESSURE, SITUATIONAL: Status: ACTIVE | Noted: 2022-05-14

## 2022-05-14 NOTE — ASSESSMENT & PLAN NOTE
Patient will proceed with complete blood work  She has been avoiding vitamin-A containing supplements due to previous elevated level of vitamin A  She remains on vitamin B12 injections at home every other week  Daily dose of vitamin D3 approximately 2000 units daily  Patient remains on iron 325 mg daily

## 2022-05-14 NOTE — ASSESSMENT & PLAN NOTE
Patient uses multivitamin with vitamin-D along with vitamin D3 1000 units daily  Proceed with blood work now

## 2022-05-14 NOTE — ASSESSMENT & PLAN NOTE
Nikki disorder with panic attacks  Patient is not a candidate for SSRI therapy due to current treatment with tramadol and amitriptyline  She uses alprazolam strictly as directed, as needed  She has been under significant amount of stress as patient is graduating from optometry school in [place]  Previous therapy with atenolol caused adverse side effects  Patient will try BuSpar p r n  daytime

## 2022-05-14 NOTE — ASSESSMENT & PLAN NOTE
Blood pressure is on the high side of normal     Patient admits to significant stress and anxiety which is likely contributing to borderline blood pressure during office visit today  We discussed importance of meditation, stress reduction, exercise, low-sodium low caffeine diet  Patient has an access to blood pressure cuff and will monitor blood pressure of side  Goal for BP to be below 130/80  She will contact me if her readings are not within the target range

## 2022-05-17 LAB
CLINICAL INFO: ABNORMAL
CYTO CVX: ABNORMAL
DATE PREVIOUS BX: ABNORMAL
HPV E6+E7 MRNA CVX QL NAA+PROBE: DETECTED
HPV16 DNA CVX QL NAA+PROBE: NOT DETECTED
HPV18+45 E6+E7 MRNA CVX QL NAA+PROBE: NOT DETECTED
LMP START DATE: ABNORMAL
SL AMB PREV. PAP:: ABNORMAL
SPECIMEN SOURCE CVX/VAG CYTO: ABNORMAL

## 2022-06-05 DIAGNOSIS — F41.1 GAD (GENERALIZED ANXIETY DISORDER): ICD-10-CM

## 2022-06-06 RX ORDER — ALPRAZOLAM 0.5 MG/1
0.5 TABLET ORAL DAILY PRN
Qty: 30 TABLET | Refills: 3 | Status: SHIPPED | OUTPATIENT
Start: 2022-06-06

## 2022-07-06 DIAGNOSIS — E53.8 VITAMIN B12 DEFICIENCY: ICD-10-CM

## 2022-07-07 RX ORDER — CYANOCOBALAMIN 1000 UG/ML
INJECTION, SOLUTION INTRAMUSCULAR; SUBCUTANEOUS
Qty: 6 ML | Refills: 5 | Status: SHIPPED | OUTPATIENT
Start: 2022-07-07 | End: 2022-10-03 | Stop reason: SDUPTHER

## 2022-07-17 DIAGNOSIS — F41.1 GAD (GENERALIZED ANXIETY DISORDER): ICD-10-CM

## 2022-07-18 DIAGNOSIS — Z30.41 ENCOUNTER FOR SURVEILLANCE OF CONTRACEPTIVE PILLS: ICD-10-CM

## 2022-07-18 RX ORDER — BUSPIRONE HYDROCHLORIDE 5 MG/1
TABLET ORAL
Qty: 90 TABLET | Refills: 5 | Status: SHIPPED | OUTPATIENT
Start: 2022-07-18

## 2022-07-18 RX ORDER — NORETHINDRONE AND ETHINYL ESTRADIOL 1; .035 MG/1; MG/1
TABLET ORAL
Qty: 84 TABLET | Refills: 4 | Status: SHIPPED | OUTPATIENT
Start: 2022-07-18 | End: 2022-10-03

## 2022-08-07 LAB
25(OH)D3+25(OH)D2 SERPL-MCNC: 35.7 NG/ML (ref 30–100)
ALBUMIN SERPL-MCNC: 4.6 G/DL (ref 3.8–4.8)
ALBUMIN/GLOB SERPL: 1.8 {RATIO} (ref 1.2–2.2)
ALP SERPL-CCNC: 90 IU/L (ref 44–121)
ALT SERPL-CCNC: 18 IU/L (ref 0–32)
AST SERPL-CCNC: 22 IU/L (ref 0–40)
BASOPHILS # BLD AUTO: 0 X10E3/UL (ref 0–0.2)
BASOPHILS NFR BLD AUTO: 1 %
BILIRUB SERPL-MCNC: 0.6 MG/DL (ref 0–1.2)
BUN SERPL-MCNC: 15 MG/DL (ref 6–20)
BUN/CREAT SERPL: 20 (ref 9–23)
CALCIUM SERPL-MCNC: 9.2 MG/DL (ref 8.7–10.2)
CHLORIDE SERPL-SCNC: 99 MMOL/L (ref 96–106)
CO2 SERPL-SCNC: 25 MMOL/L (ref 20–29)
CREAT SERPL-MCNC: 0.76 MG/DL (ref 0.57–1)
EGFR: 107 ML/MIN/1.73
EOSINOPHIL # BLD AUTO: 0.1 X10E3/UL (ref 0–0.4)
EOSINOPHIL NFR BLD AUTO: 1 %
ERYTHROCYTE [DISTWIDTH] IN BLOOD BY AUTOMATED COUNT: 12 % (ref 11.7–15.4)
FERRITIN SERPL-MCNC: 77 NG/ML (ref 15–150)
GLOBULIN SER-MCNC: 2.5 G/DL (ref 1.5–4.5)
GLUCOSE SERPL-MCNC: 100 MG/DL (ref 65–99)
HBA1C MFR BLD: 5 % (ref 4.8–5.6)
HCT VFR BLD AUTO: 41.9 % (ref 34–46.6)
HGB BLD-MCNC: 14.2 G/DL (ref 11.1–15.9)
IMM GRANULOCYTES # BLD: 0 X10E3/UL (ref 0–0.1)
IMM GRANULOCYTES NFR BLD: 0 %
IRON SATN MFR SERPL: 24 % (ref 15–55)
IRON SERPL-MCNC: 114 UG/DL (ref 27–159)
LYMPHOCYTES # BLD AUTO: 1.9 X10E3/UL (ref 0.7–3.1)
LYMPHOCYTES NFR BLD AUTO: 32 %
MCH RBC QN AUTO: 31.2 PG (ref 26.6–33)
MCHC RBC AUTO-ENTMCNC: 33.9 G/DL (ref 31.5–35.7)
MCV RBC AUTO: 92 FL (ref 79–97)
MONOCYTES # BLD AUTO: 0.4 X10E3/UL (ref 0.1–0.9)
MONOCYTES NFR BLD AUTO: 7 %
NEUTROPHILS # BLD AUTO: 3.6 X10E3/UL (ref 1.4–7)
NEUTROPHILS NFR BLD AUTO: 59 %
PLATELET # BLD AUTO: 274 X10E3/UL (ref 150–450)
POTASSIUM SERPL-SCNC: 4.4 MMOL/L (ref 3.5–5.2)
PROT SERPL-MCNC: 7.1 G/DL (ref 6–8.5)
RBC # BLD AUTO: 4.55 X10E6/UL (ref 3.77–5.28)
SODIUM SERPL-SCNC: 137 MMOL/L (ref 134–144)
TIBC SERPL-MCNC: 471 UG/DL (ref 250–450)
TSH SERPL DL<=0.005 MIU/L-ACNC: 1.01 UIU/ML (ref 0.45–4.5)
UIBC SERPL-MCNC: 357 UG/DL (ref 131–425)
VIT A SERPL-MCNC: 61.1 UG/DL (ref 18.9–57.3)
VIT B12 SERPL-MCNC: 507 PG/ML (ref 232–1245)
WBC # BLD AUTO: 6.1 X10E3/UL (ref 3.4–10.8)

## 2022-08-08 DIAGNOSIS — E53.8 VITAMIN B12 DEFICIENCY: Primary | ICD-10-CM

## 2022-08-13 ENCOUNTER — HOSPITAL ENCOUNTER (OUTPATIENT)
Dept: RADIOLOGY | Facility: HOSPITAL | Age: 32
Discharge: HOME | End: 2022-08-13
Payer: COMMERCIAL

## 2022-08-13 DIAGNOSIS — M25.551 HIP PAIN, BILATERAL: ICD-10-CM

## 2022-08-13 DIAGNOSIS — M25.552 HIP PAIN, BILATERAL: ICD-10-CM

## 2022-08-13 PROCEDURE — 73521 X-RAY EXAM HIPS BI 2 VIEWS: CPT

## 2022-08-13 PROCEDURE — 72202 X-RAY EXAM SI JOINTS 3/> VWS: CPT

## 2022-09-30 DIAGNOSIS — F41.1 GAD (GENERALIZED ANXIETY DISORDER): ICD-10-CM

## 2022-09-30 RX ORDER — ALPRAZOLAM 0.5 MG/1
0.5 TABLET ORAL DAILY PRN
Qty: 30 TABLET | Refills: 3 | Status: SHIPPED | OUTPATIENT
Start: 2022-09-30

## 2022-10-03 DIAGNOSIS — E53.8 VITAMIN B12 DEFICIENCY: ICD-10-CM

## 2022-10-03 NOTE — TELEPHONE ENCOUNTER
Medication was not assigned to a HF protocol  Medication failed [facility] protocol  Please forward to your office staff for further review as this medication was reviewed by a [facility] RN

## 2022-10-05 RX ORDER — CYANOCOBALAMIN 1000 UG/ML
INJECTION, SOLUTION INTRAMUSCULAR; SUBCUTANEOUS
Qty: 6 ML | Refills: 5 | Status: SHIPPED | OUTPATIENT
Start: 2022-10-05

## 2022-10-11 PROBLEM — Z01.419 GYNECOLOGIC EXAM NORMAL: Status: RESOLVED | Noted: 2022-05-10 | Resolved: 2022-10-11

## 2022-11-16 DIAGNOSIS — E53.8 VITAMIN B12 DEFICIENCY: ICD-10-CM

## 2023-01-14 DIAGNOSIS — F41.1 GAD (GENERALIZED ANXIETY DISORDER): ICD-10-CM

## 2023-01-16 RX ORDER — ALPRAZOLAM 0.5 MG/1
0.5 TABLET ORAL DAILY PRN
Qty: 30 TABLET | Refills: 3 | Status: SHIPPED | OUTPATIENT
Start: 2023-01-16

## 2023-02-12 DIAGNOSIS — F41.1 GAD (GENERALIZED ANXIETY DISORDER): ICD-10-CM

## 2023-02-13 RX ORDER — ALPRAZOLAM 0.5 MG/1
0.5 TABLET ORAL DAILY PRN
Qty: 30 TABLET | Refills: 3 | Status: SHIPPED | OUTPATIENT
Start: 2023-02-13

## 2023-02-14 DIAGNOSIS — Z30.41 ENCOUNTER FOR SURVEILLANCE OF CONTRACEPTIVE PILLS: ICD-10-CM

## 2023-02-14 RX ORDER — NORETHINDRONE AND ETHINYL ESTRADIOL 1; .035 MG/1; MG/1
TABLET ORAL
Qty: 84 TABLET | Refills: 1 | Status: SHIPPED | OUTPATIENT
Start: 2023-02-14

## 2023-04-07 ENCOUNTER — ANNUAL EXAM (OUTPATIENT)
Dept: OBGYN CLINIC | Facility: CLINIC | Age: 33
End: 2023-04-07

## 2023-04-07 VITALS
WEIGHT: 175 LBS | DIASTOLIC BLOOD PRESSURE: 84 MMHG | HEIGHT: 69 IN | SYSTOLIC BLOOD PRESSURE: 130 MMHG | BODY MASS INDEX: 25.92 KG/M2

## 2023-04-07 DIAGNOSIS — Z30.41 ENCOUNTER FOR SURVEILLANCE OF CONTRACEPTIVE PILLS: ICD-10-CM

## 2023-04-07 DIAGNOSIS — Z01.419 ROUTINE GYNECOLOGICAL EXAMINATION: Primary | ICD-10-CM

## 2023-04-07 RX ORDER — NORETHINDRONE AND ETHINYL ESTRADIOL 1; .035 MG/1; MG/1
TABLET ORAL
Qty: 84 TABLET | Refills: 1 | Status: SHIPPED | OUTPATIENT
Start: 2023-04-07

## 2023-04-07 NOTE — PROGRESS NOTES
Assessment/Plan   Problem List Items Addressed This Visit        Other    Routine gynecological examination - Primary     Pap guidelines reviewed  Pap with HPV done today  Reviewed longer term options for birth control including NuvaRing, Nexplanon, Depo Provera, Mirena IUD, ParaGard IUD  Patient most interested in [place] [address] IUD  Reviewed risks of insertion including perforation, migration that can lead to scarring, surgery and issues with infertility  Reviewed expulsion risk, risk of ectopic pregnancy as well as pelvic inflammatory disease  Reviewed common bleeding patterns and side effects  Would like to see how pap smear results are and consider colposcopy prior to IUD insertion pending results  Patient would like to continue the OCP in the meantime  Script renewed  Relevant Orders    IGP, Aptima HPV, Rfx 16/18,45   Other Visit Diagnoses     Encounter for surveillance of contraceptive pills        Relevant Medications    norethindrone-ethinyl estradiol (Nortrel 1/35, 21,) 1-35 MG-MCG per tablet          Subjective:     Patient ID: Rickey Fiore is a 34 y o  y o  female  HPI  35 yo seen for annual exam  Currently on Nortrel 1/35 continuously secondary to PCOS  Reports tolerates well but has been having more elevated blood pressures in the last year  Has decided not to have any children and would like to discuss other options  Last pap: 5/10/2022 NILM (+)HRHPV non 16, 18  5/4/2021 NILM (+)HRHPV non 16, 18  6/10/2020 NILM (+)HRHPV non 16, 18  Patient declines colposcopies in 2021 and 2022  The following portions of the patient's history were reviewed and updated as appropriate: She  has a past medical history of ADHD, Anxiety, Fibromyalgia, HPV (human papilloma virus) infection, Ovarian cyst, PCOS (polycystic ovarian syndrome), and Vitamin B12 deficiency    She   Patient Active Problem List    Diagnosis Date Noted   • Routine gynecological examination 04/07/2023   • Elevated blood pressure, "situational 05/14/2022   • 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   • Encounter for gynecological examination (general) (routine) without abnormal findings 01/21/2019   • VANITA (generalized anxiety disorder) 08/13/2018   • Palpitations 08/13/2018   • Allergic rhinitis 10/03/2017   • Low vitamin D level 02/16/2016   • Panic attacks 02/16/2016   • Vitamin B12 deficiency 02/16/2016   • Oligomenorrhea 10/09/2015   • Fibromyalgia 04/15/2013   • ADHD 01/01/1999     She  has a past surgical history that includes Amina-en-y procedure (2009); Bariatric Surgery (03/09/2009); and AUGMENTATION BREAST  Her family history includes Breast cancer in her maternal grandmother; Breast cancer (age of onset: 62) in her mother; Diabetes in her maternal grandmother and mother; Hypertension in her paternal grandmother; Lung cancer in her maternal grandmother  She  reports that she has never smoked  She has never been exposed to tobacco smoke  She has never used smokeless tobacco  She reports that she does not drink alcohol and does not use drugs    Current Outpatient Medications   Medication Sig Dispense Refill   • ALPRAZolam (XANAX) 0 5 mg tablet Take 1 tablet (0 5 mg total) by mouth daily as needed for anxiety 30 tablet 3   • amitriptyline (ELAVIL) 50 mg tablet Take 1 tablet by mouth daily at bedtime     • B-D 3CC LUER-SHANIQUA SYR 25GX5/8\" 25G X 5/8\" 3 ML MISC USE 1 SYRINGE INTRAMUSCULARLY (INTO THE MUSCLE) EVERY 14 DAYS FOR VITAMIN B12 2 each 11   • bimatoprost (LATISSE) 0 03 % ophthalmic solution INSTILL 1 DROP INTO EACH EYE IN THE PM--[place] SENT 1/18/21     • busPIRone (BUSPAR) 5 mg tablet Take 1 tab twice a day for 7 days PRN : anxiety, OK  to increase dose afterwards to 10 mg BID 90 tablet 5   • Calcium Carbonate-Vitamin D (CALCIUM-D PO) Take by mouth     • celecoxib (CeleBREX) 200 mg capsule Take 200 mg by mouth daily PRN     • Cholecalciferol (VITAMIN D3) 2000 units capsule Take 1 capsule by mouth daily     • cyanocobalamin 1,000 mcg/mL " "Inject 1 mL every 14 days  6 mL 1   • norethindrone-ethinyl estradiol (Nortrel 1, 21,) 1-35 MG-MCG per tablet Take 1 tablet at same time daily  84 tablet 1   • traMADol (ULTRAM) 50 mg tablet Take 50 mg by mouth 4 (four) times a day    3     No current facility-administered medications for this visit  She is allergic to sulfa antibiotics, acetaminophen, and propoxyphene       Menstrual History:  OB History        0    Para   0    Term   0       0    AB   0    Living   0       SAB   0    IAB   0    Ectopic   0    Multiple   0    Live Births   0           Obstetric Comments   Menarche   No LMP recorded  (Menstrual status: Birth Control)  Review of Systems   Constitutional: Negative for fatigue, fever and unexpected weight change  HENT: Negative for dental problem and sinus pressure  Eyes: Negative for visual disturbance  Respiratory: Negative for cough, shortness of breath and wheezing  Cardiovascular: Negative for chest pain  Gastrointestinal: Negative for abdominal pain, blood in stool, constipation, diarrhea, nausea and vomiting  Endocrine: Negative for polydipsia  Genitourinary: Negative for difficulty urinating, dyspareunia, dysuria, frequency, hematuria, pelvic pain and urgency  Musculoskeletal: Negative for arthralgias and back pain  Neurological: Negative for dizziness, seizures, light-headedness and headaches  Psychiatric/Behavioral: Negative for suicidal ideas  The patient is not nervous/anxious  Objective:  Vitals:    23 0904   BP: 130/84   BP Location: Right arm   Patient Position: Sitting   Cuff Size: Standard   Weight: 79 4 kg (175 lb)   Height: 5' 9\" (1 753 m)      Physical Exam  Constitutional:       Appearance: Normal appearance  She is well-developed  Genitourinary:      Vulva and bladder normal       No lesions in the vagina  Right Labia: No rash, tenderness, lesions or skin changes       Left Labia: No tenderness, " lesions, skin changes or rash  No labial fusion noted  No inguinal adenopathy present in the right or left side  No vaginal discharge, erythema, tenderness or bleeding  Right Adnexa: not tender, not full and no mass present  Left Adnexa: not tender, not full and no mass present  No cervical motion tenderness, discharge or lesion  Uterus is not enlarged, tender or irregular  No uterine mass detected  No urethral prolapse, tenderness or mass present  Bladder is not tender  Breasts:     Breasts are symmetrical       Right: No swelling, bleeding, inverted nipple, mass, nipple discharge, skin change or tenderness  Left: No swelling, bleeding, inverted nipple, mass, nipple discharge, skin change or tenderness  HENT:      Head: Normocephalic and atraumatic  Neck:      Thyroid: No thyromegaly  Cardiovascular:      Rate and Rhythm: Normal rate and regular rhythm  Heart sounds: Normal heart sounds  No murmur heard  No friction rub  No gallop  Pulmonary:      Effort: Pulmonary effort is normal  No respiratory distress  Breath sounds: Normal breath sounds  No wheezing  Abdominal:      General: There is no distension  Palpations: Abdomen is soft  There is no mass  Tenderness: There is no abdominal tenderness  There is no guarding or rebound  Hernia: No hernia is present  Lymphadenopathy:      Cervical: No cervical adenopathy  Upper Body:      Right upper body: No supraclavicular, axillary or pectoral adenopathy  Left upper body: No supraclavicular, axillary or pectoral adenopathy  Lower Body: No right inguinal adenopathy  No left inguinal adenopathy  Neurological:      Mental Status: She is alert and oriented to person, place, and time  Skin:     General: Skin is warm and dry     Psychiatric:         Behavior: Behavior normal

## 2023-04-07 NOTE — ASSESSMENT & PLAN NOTE
Pap guidelines reviewed  Pap with HPV done today  Reviewed longer term options for birth control including NuvaRing, Nexplanon, Depo Provera, Mirena IUD, ParaGard IUD  Patient most interested in [address] IUD  Reviewed risks of insertion including perforation, migration that can lead to scarring, surgery and issues with infertility  Reviewed expulsion risk, risk of ectopic pregnancy as well as pelvic inflammatory disease  Reviewed common bleeding patterns and side effects  Would like to see how pap smear results are and consider colposcopy prior to IUD insertion pending results  Patient would like to continue the OCP in the meantime  Script renewed

## 2023-05-08 NOTE — PROGRESS NOTES
Here for colp pap 4/2023 neg + other HPV 5/10/2022 NILM (+)HRHPV non 16, 18  5/4/2021 NILM (+)HRHPV non 16, 18  6/10/2020 NILM (+)HRHPV non 16, 18  Patient declined colposcopies in 2021 and 2022  Colposcopy     Date/Time 5/9/2023 1:00 PM     [place] Protocol   Procedure performed by:  Consent: Verbal consent obtained  Risks and benefits: risks, benefits and alternatives were discussed  Consent given by: patient  Patient identity confirmed: verbally with patient       Performed by  JONATHAN Sumner     Authorized by JONATHAN Sumner        Procedure Details   Procedure: Colposcopy w/ cervical biopsy and ECC      Under satisfactory analgesia the patient was prepped and draped in the dorsal lithotomy position: yes      Allyn speculum was placed in the vagina: yes      Under colposcopic examination the transition zone was seen in entirety: yes      Endocervix was curetted using a Kevorkian curette: yes      Cervical biopsy performed with a cervical biopsy punch: yes      Monsel's solution was applied: yes      Biopsy(s): yes      Location:  4:00 and 9:00 AWE     Specimen to pathology: yes       Post-procedure      Findings: White epithelium      Impression: Low grade cervical dysplasia      Patient tolerance of procedure: Tolerated well, no immediate complications     Comments       Call with increasing foul smelling discharge fever, chills

## 2023-05-09 ENCOUNTER — PROCEDURE VISIT (OUTPATIENT)
Dept: OBGYN CLINIC | Facility: CLINIC | Age: 33
End: 2023-05-09

## 2023-05-09 VITALS
HEIGHT: 69 IN | SYSTOLIC BLOOD PRESSURE: 124 MMHG | WEIGHT: 175 LBS | BODY MASS INDEX: 25.92 KG/M2 | DIASTOLIC BLOOD PRESSURE: 84 MMHG

## 2023-05-09 DIAGNOSIS — B97.7 HPV (HUMAN PAPILLOMA VIRUS) INFECTION: Primary | ICD-10-CM

## 2023-05-09 DIAGNOSIS — Z32.02 PREGNANCY TEST NEGATIVE: ICD-10-CM

## 2023-05-09 LAB — SL AMB POCT URINE HCG: NORMAL

## 2023-05-22 LAB
PATH REPORT.COMMENTS IMP SPEC: NORMAL
PATH REPORT.SITE OF ORIGIN SPEC: NORMAL
PAYMENT PROCEDURE: NORMAL
SL AMB .: NORMAL

## 2023-06-06 PROBLEM — Z01.419 ROUTINE GYNECOLOGICAL EXAMINATION: Status: RESOLVED | Noted: 2023-04-07 | Resolved: 2023-06-06

## 2023-06-09 DIAGNOSIS — F41.1 GAD (GENERALIZED ANXIETY DISORDER): ICD-10-CM

## 2023-06-09 RX ORDER — ALPRAZOLAM 0.5 MG/1
0.5 TABLET ORAL DAILY PRN
Qty: 30 TABLET | Refills: 1 | Status: SHIPPED | OUTPATIENT
Start: 2023-06-09

## 2023-06-09 RX ORDER — NALOXONE HYDROCHLORIDE 4 MG/.1ML
SPRAY NASAL
Qty: 1 EACH | Refills: 1 | Status: SHIPPED | OUTPATIENT
Start: 2023-06-09 | End: 2024-06-08

## 2023-06-22 DIAGNOSIS — E53.8 VITAMIN B12 DEFICIENCY: ICD-10-CM

## 2023-07-06 PROBLEM — N87.1 HIGH GRADE SQUAMOUS INTRAEPITHELIAL LESION (HGSIL), GRADE 2 CIN, ON BIOPSY OF CERVIX: Status: ACTIVE | Noted: 2023-07-06

## 2023-07-06 NOTE — PROGRESS NOTES
History and Physical  [address]  [address] [facility], [address], [place], [facility]    Assessment/Plan:  1. High grade squamous intraepithelial lesion (HGSIL), grade 2 NIURKA, on biopsy of cervix  Assessment & Plan:  Reviewed colposcopy results w/ patient and how pap smears and colposcopy are done to assess risk of progression of cervical changes to cervical cancer in the future. Current ASCCP recommendations for NIURKA II give options of proceeding with LEEP or observation with colpo and cotesting at 6 and 12 months. Observation is offered for patients who may be considering future pregnancy, due to LEEP procedure showing increase in risk of PPROM, PTD or cervical stenosis in future pregnancy. Although these are risk, they are low and the risk of progression to cervical cancer needs to be considered. If patient declines LEEP in favor of colposcopy and pap at 6 and 12 months, if any results show NIURKA III, then LEEP would be recommended as risks of not performing LEEP outweigh risk of future pregnancy concerns. Reviewed procedure, alternatives, risks, benefits. All questions were answered, pt expressed understanding. Paola Vallecillo does not plan to have children in the future and wishes to proceed with LEEP for treatment of NIURKA II. Discussed expected procedure, risks (including risk of bleeding, infection, damage to surrounding structure including but not limited to bladder, bowel, blood vessels and ureters), benefits, recovery and limitations. All questions answered and consent signed. Discussed will need PATs - CBC. PCP clearance is not necessary. Surgical scheduler will call patient to arrange. History and Physical    Subjective:   Zara Tolliver is a 34 y.o. Rodzhang Mote female.   CC: here to discuss colposcopy      HPI:   5/9/20223 colpo - bx 4:00 and 10:00 squamous dysplasia favor NIURKA II, both on chronic and acute inflammation background, ECC benign    4/7/2023 neg with + HR HPV, neg 16, 18, 45  5/10/2022 neg pap, + HR HPV, neg 16, 18, 45  2021 neg pap, + HR HPV, neg 16, 18, 45  6/10/2020 neg pap, + HR HPV, neg 16, 18, 45  8/15/2017 neg pap, HPV not done  Patient reports h/o abnl pap in past and colpo done, was normal    Colpo suggested  and  pt declined. OCPs - continuous, no periods. non-smoker. No new partners  Gardasil complete      Gyn History  No LMP recorded. (Menstrual status: Birth Control). continuous OCP use. Last pap smear: 2023    She  reports being sexually active and has had partner(s) who are male. She reports using the following method of birth control/protection: OCP. OB History      Past Medical History:  No date: Abnormal Pap smear of cervix  No date: ADHD  No date: Anemia  No date:  Anxiety  No date: Fibromyalgia      Comment:  seeing pain management  No date: HPV (human papilloma virus) infection      Comment:  early   No date: Ovarian cyst  No date: PCOS (polycystic ovarian syndrome)  No date: Vitamin B12 deficiency     Past Surgical History:  : AUGMENTATION BREAST  2009: CHINTAN-EN-Y PROCEDURE     Social History     Tobacco Use   • Smoking status: Never     Passive exposure: Never   • Smokeless tobacco: Never   Vaping Use   • Vaping Use: Never used   Substance Use Topics   • Alcohol use: No   • Drug use: No          Current Outpatient Medications:   •  ALPRAZolam (XANAX) 0.5 mg tablet, Take 1 tablet (0.5 mg total) by mouth daily as needed for anxiety, Disp: 30 tablet, Rfl: 1  •  amitriptyline (ELAVIL) 50 mg tablet, Take 1 tablet by mouth daily at bedtime, Disp: , Rfl:   •  bimatoprost (LATISSE) 0.03 % ophthalmic solution, INSTILL 1 DROP INTO EACH EYE IN THE PM--[place] SENT 21, Disp: , Rfl:   •  busPIRone (BUSPAR) 5 mg tablet, Take 1 tab twice a day for 7 days PRN : anxiety, OK  to increase dose afterwards to 10 mg BID, Disp: 90 tablet, Rfl: 5  •  Calcium Carbonate-Vitamin D (CALCIUM-D PO), Take by mouth, Disp: , Rfl: •  Cholecalciferol (VITAMIN D3) 2000 units capsule, Take 1 capsule by mouth daily, Disp: , Rfl:   •  cyanocobalamin 1,000 mcg/mL, Inject 1 mL every 14 days. , Disp: 6 mL, Rfl: 1  •  naloxone (NARCAN) 4 mg/0.1 mL nasal spray, Administer 1 spray into a nostril. If no response after 2-3 minutes, give another dose in the other nostril using a new spray., Disp: 1 each, Rfl: 1  •  norethindrone-ethinyl estradiol (Nortrel 1/35, 21,) 1-35 MG-MCG per tablet, Take 1 tablet at same time daily. , Disp: 84 tablet, Rfl: 1  •  SYRINGE-NEEDLE, DISP, 3 ML (B-D 3CC LUER-SHANIQUA SYR 25GX5/8") 25G X 5/8" 3 ML MISC, Inject vitamin B12 IM every 2 weeks, Disp: 6 each, Rfl: 3  •  traMADol (ULTRAM) 50 mg tablet, Take 50 mg by mouth 4 (four) times a day  , Disp: , Rfl: 3    She is allergic to sulfa antibiotics, acetaminophen, and propoxyphene. .    ROS: Review of Systems   Constitutional: Negative. Gastrointestinal: Negative. Genitourinary: Negative. Psychiatric/Behavioral: Negative.         Objective:  /72   Ht 5' 7" (1.702 m)   Wt 78.9 kg (174 lb)   Breastfeeding No   BMI 27.25 kg/m²     Gen: alert, no acute distress  Cards: regular rate  Resp: unlabored breathing  Abdomen: soft, non-tender, non-distended  Extremities: non-tender, no edema

## 2023-07-06 NOTE — H&P (VIEW-ONLY)
History and Physical  [address]  [address] [facility], [address], [place], [address] [facility]    Assessment/Plan:  1. High grade squamous intraepithelial lesion (HGSIL), grade 2 NIURKA, on biopsy of cervix  Assessment & Plan:  Reviewed colposcopy results w/ patient and how pap smears and colposcopy are done to assess risk of progression of cervical changes to cervical cancer in the future. Current ASCCP recommendations for NIURKA II give options of proceeding with LEEP or observation with colpo and cotesting at 6 and 12 months. Observation is offered for patients who may be considering future pregnancy, due to LEEP procedure showing increase in risk of PPROM, PTD or cervical stenosis in future pregnancy. Although these are risk, they are low and the risk of progression to cervical cancer needs to be considered. If patient declines LEEP in favor of colposcopy and pap at 6 and 12 months, if any results show NIURKA III, then LEEP would be recommended as risks of not performing LEEP outweigh risk of future pregnancy concerns. Reviewed procedure, alternatives, risks, benefits. All questions were answered, pt expressed understanding. Irven Paget does not plan to have children in the future and wishes to proceed with LEEP for treatment of NIURKA II. Discussed expected procedure, risks (including risk of bleeding, infection, damage to surrounding structure including but not limited to bladder, bowel, blood vessels and ureters), benefits, recovery and limitations. All questions answered and consent signed. Discussed will need PATs - CBC. PCP clearance is not necessary. Surgical scheduler will call patient to arrange. History and Physical    Subjective:   Trish Tijerina is a 34 y.o. Delmar Plan female.   CC: here to discuss colposcopy      HPI:   5/9/20223 colpo - bx 4:00 and 10:00 squamous dysplasia favor NIURKA II, both on chronic and acute inflammation background, ECC benign    4/7/2023 neg with + HR HPV, neg 16, 18, 45  5/10/2022 neg pap, + HR HPV, neg 16, 18, 45  2021 neg pap, + HR HPV, neg 16, 18, 45  6/10/2020 neg pap, + HR HPV, neg 16, 18, 45  8/15/2017 neg pap, HPV not done  Patient reports h/o abnl pap in past and colpo done, was normal    Colpo suggested  and  pt declined. OCPs - continuous, no periods. non-smoker. No new partners  Gardasil complete      Gyn History  No LMP recorded. (Menstrual status: Birth Control). continuous OCP use. Last pap smear: 2023    She  reports being sexually active and has had partner(s) who are male. She reports using the following method of birth control/protection: OCP. OB History      Past Medical History:  No date: Abnormal Pap smear of cervix  No date: ADHD  No date: Anemia  No date:  Anxiety  No date: Fibromyalgia      Comment:  seeing pain management  No date: HPV (human papilloma virus) infection      Comment:  early   No date: Ovarian cyst  No date: PCOS (polycystic ovarian syndrome)  No date: Vitamin B12 deficiency     Past Surgical History:  : AUGMENTATION BREAST  2009: CHINTAN-EN-Y PROCEDURE     Social History     Tobacco Use   • Smoking status: Never     Passive exposure: Never   • Smokeless tobacco: Never   Vaping Use   • Vaping Use: Never used   Substance Use Topics   • Alcohol use: No   • Drug use: No          Current Outpatient Medications:   •  ALPRAZolam (XANAX) 0.5 mg tablet, Take 1 tablet (0.5 mg total) by mouth daily as needed for anxiety, Disp: 30 tablet, Rfl: 1  •  amitriptyline (ELAVIL) 50 mg tablet, Take 1 tablet by mouth daily at bedtime, Disp: , Rfl:   •  bimatoprost (LATISSE) 0.03 % ophthalmic solution, INSTILL 1 DROP INTO EACH EYE IN THE PM--[place] SENT 21, Disp: , Rfl:   •  busPIRone (BUSPAR) 5 mg tablet, Take 1 tab twice a day for 7 days PRN : anxiety, OK  to increase dose afterwards to 10 mg BID, Disp: 90 tablet, Rfl: 5  •  Calcium Carbonate-Vitamin D (CALCIUM-D PO), Take by mouth, Disp: , Rfl: •  Cholecalciferol (VITAMIN D3) 2000 units capsule, Take 1 capsule by mouth daily, Disp: , Rfl:   •  cyanocobalamin 1,000 mcg/mL, Inject 1 mL every 14 days. , Disp: 6 mL, Rfl: 1  •  naloxone (NARCAN) 4 mg/0.1 mL nasal spray, Administer 1 spray into a nostril. If no response after 2-3 minutes, give another dose in the other nostril using a new spray., Disp: 1 each, Rfl: 1  •  norethindrone-ethinyl estradiol (Nortrel 1/35, 21,) 1-35 MG-MCG per tablet, Take 1 tablet at same time daily. , Disp: 84 tablet, Rfl: 1  •  SYRINGE-NEEDLE, DISP, 3 ML (B-D 3CC LUER-SHANIQUA SYR 25GX5/8") 25G X 5/8" 3 ML MISC, Inject vitamin B12 IM every 2 weeks, Disp: 6 each, Rfl: 3  •  traMADol (ULTRAM) 50 mg tablet, Take 50 mg by mouth 4 (four) times a day  , Disp: , Rfl: 3    She is allergic to sulfa antibiotics, acetaminophen, and propoxyphene. .    ROS: Review of Systems   Constitutional: Negative. Gastrointestinal: Negative. Genitourinary: Negative. Psychiatric/Behavioral: Negative.         Objective:  /72   Ht 5' 7" (1.702 m)   Wt 78.9 kg (174 lb)   Breastfeeding No   BMI 27.25 kg/m²     Gen: alert, no acute distress  Cards: regular rate  Resp: unlabored breathing  Abdomen: soft, non-tender, non-distended  Extremities: non-tender, no edema

## 2023-07-07 ENCOUNTER — OFFICE VISIT (OUTPATIENT)
Dept: OBGYN CLINIC | Facility: CLINIC | Age: 33
End: 2023-07-07
Payer: COMMERCIAL

## 2023-07-07 VITALS
HEIGHT: 67 IN | SYSTOLIC BLOOD PRESSURE: 120 MMHG | BODY MASS INDEX: 27.31 KG/M2 | WEIGHT: 174 LBS | DIASTOLIC BLOOD PRESSURE: 72 MMHG

## 2023-07-07 DIAGNOSIS — N87.1 HIGH GRADE SQUAMOUS INTRAEPITHELIAL LESION (HGSIL), GRADE 2 CIN, ON BIOPSY OF CERVIX: Primary | ICD-10-CM

## 2023-07-07 PROCEDURE — 99214 OFFICE O/P EST MOD 30 MIN: CPT | Performed by: OBSTETRICS & GYNECOLOGY

## 2023-07-07 NOTE — PATIENT INSTRUCTIONS
You will be contacted by Roni Lenz, our Surgical Scheduler to schedule your procedure/surgery. She will also explain any preoperative labs, studies, or preop clearance necessary. If your surgery is scheduled more than 30 days from your visit today, you may need another appointment prior to procedure, per hospital regulations. If you are not seen again in the office prior to surgery: You will be called the afternoon prior to your procedure to be informed of the time you need to arrive at the hospital and where to go. You will be told which medications to take the morning of procedure and which to hold. Please stop all herbal medications, aspirin, fish oil for 7 days prior to procedure. Please avoid ibuprofen of 7 days prior unless absolutely necessary. No eating or drinking after midnight the night before procedure. If you have questions prior to the procedure, please call our office and ask to speak to the Surgical Scheduler. IF YOU TEST POSITIVE FOR COVID 19 BETWEEN THIS VISIT AND YOUR SURGERY, PLEASE CALL OUR OFFICE. - Per [facility] Guidelines, there is a waiting time from prior Covid 19 infection and undergoing anesthesia for elective procedures.

## 2023-07-07 NOTE — ASSESSMENT & PLAN NOTE
Reviewed colposcopy results w/ patient and how pap smears and colposcopy are done to assess risk of progression of cervical changes to cervical cancer in the future. Current ASCCP recommendations for NIURKA II give options of proceeding with LEEP or observation with colpo and cotesting at 6 and 12 months. Observation is offered for patients who may be considering future pregnancy, due to LEEP procedure showing increase in risk of PPROM, PTD or cervical stenosis in future pregnancy. Although these are risk, they are low and the risk of progression to cervical cancer needs to be considered. If patient declines LEEP in favor of colposcopy and pap at 6 and 12 months, if any results show NIURKA III, then LEEP would be recommended as risks of not performing LEEP outweigh risk of future pregnancy concerns. Reviewed procedure, alternatives, risks, benefits. All questions were answered, pt expressed understanding. Arlen Hamilton does not plan to have children in the future and wishes to proceed with LEEP for treatment of NIURKA II. Discussed expected procedure, risks (including risk of bleeding, infection, damage to surrounding structure including but not limited to bladder, bowel, blood vessels and ureters), benefits, recovery and limitations. All questions answered and consent signed. Discussed will need PATs - CBC. PCP clearance is not necessary. Surgical scheduler will call patient to arrange.

## 2023-07-11 ENCOUNTER — APPOINTMENT (OUTPATIENT)
Dept: LAB | Facility: HOSPITAL | Age: 33
End: 2023-07-11
Payer: COMMERCIAL

## 2023-07-11 DIAGNOSIS — Z01.818 OTHER SPECIFIED PRE-OPERATIVE EXAMINATION: ICD-10-CM

## 2023-07-11 LAB
BASOPHILS # BLD AUTO: 0.04 THOUSANDS/ÂΜL (ref 0–0.1)
BASOPHILS NFR BLD AUTO: 1 % (ref 0–1)
EOSINOPHIL # BLD AUTO: 0.06 THOUSAND/ÂΜL (ref 0–0.61)
EOSINOPHIL NFR BLD AUTO: 1 % (ref 0–6)
ERYTHROCYTE [DISTWIDTH] IN BLOOD BY AUTOMATED COUNT: 12.4 % (ref 11.6–15.1)
HCT VFR BLD AUTO: 39.3 % (ref 34.8–46.1)
HGB BLD-MCNC: 13 G/DL (ref 11.5–15.4)
IMM GRANULOCYTES # BLD AUTO: 0.02 THOUSAND/UL (ref 0–0.2)
IMM GRANULOCYTES NFR BLD AUTO: 0 % (ref 0–2)
LYMPHOCYTES # BLD AUTO: 1.65 THOUSANDS/ÂΜL (ref 0.6–4.47)
LYMPHOCYTES NFR BLD AUTO: 29 % (ref 14–44)
MCH RBC QN AUTO: 30.2 PG (ref 26.8–34.3)
MCHC RBC AUTO-ENTMCNC: 33.1 G/DL (ref 31.4–37.4)
MCV RBC AUTO: 91 FL (ref 82–98)
MONOCYTES # BLD AUTO: 0.35 THOUSAND/ÂΜL (ref 0.17–1.22)
MONOCYTES NFR BLD AUTO: 6 % (ref 4–12)
NEUTROPHILS # BLD AUTO: 3.6 THOUSANDS/ÂΜL (ref 1.85–7.62)
NEUTS SEG NFR BLD AUTO: 63 % (ref 43–75)
NRBC BLD AUTO-RTO: 0 /100 WBCS
PLATELET # BLD AUTO: 205 THOUSANDS/UL (ref 149–390)
PMV BLD AUTO: 11.1 FL (ref 8.9–12.7)
RBC # BLD AUTO: 4.3 MILLION/UL (ref 3.81–5.12)
WBC # BLD AUTO: 5.72 THOUSAND/UL (ref 4.31–10.16)

## 2023-07-11 PROCEDURE — 36415 COLL VENOUS BLD VENIPUNCTURE: CPT

## 2023-07-11 PROCEDURE — 85025 COMPLETE CBC W/AUTO DIFF WBC: CPT

## 2023-07-24 NOTE — PRE-PROCEDURE INSTRUCTIONS
Pre-Surgery Instructions:   Medication Instructions   • ALPRAZolam (XANAX) 0.5 mg tablet Uses PRN- OK to take day of surgery   • amitriptyline (ELAVIL) 50 mg tablet Take night before surgery   • bimatoprost (LATISSE) 0.03 % ophthalmic solution Take day of surgery. • busPIRone (BUSPAR) 5 mg tablet Uses PRN- OK to take day of surgery   • cyanocobalamin 1,000 mcg/mL Instructions provided by MD   • naloxone (NARCAN) 4 mg/0.1 mL nasal spray Instructions provided by MD   • norethindrone-ethinyl estradiol (Nortrel 1/35, 21,) 1-35 MG-MCG per tablet Pt will take post op   • traMADol (ULTRAM) 50 mg tablet Uses PRN- OK to take day of surgery      Medication instructions for day surgery reviewed. Please use only a sip of water to take your instructed medications. Avoid all over the counter vitamins, supplements and NSAIDS for one week prior to surgery per anesthesia guidelines. Tylenol is ok to take as needed. You will receive a call one business day prior to surgery with an arrival time and hospital directions. If your surgery is scheduled on a Monday, the hospital will be calling you on the Friday prior to your surgery. If you have not heard from anyone by 8pm, please call the hospital supervisor through the hospital  at [phone]. Alves Charli [phone]). Do not eat or drink anything after midnight the night before your surgery, including candy, mints, lifesavers, or chewing gum. Do not drink alcohol 24hrs before your surgery. Try not to smoke at least 24hrs before your surgery. Follow the pre surgery showering instructions as listed in the [facility] Surgical Experience Booklet” or otherwise provided by your surgeon's office. Do not shave the surgical area 24 hours before surgery. Do not apply any lotions, creams, including makeup, cologne, deodorant, or perfumes after showering on the day of your surgery. No contact lenses, eye make-up, or artificial eyelashes.  Remove nail polish, including gel polish, and any artificial, gel, or acrylic nails if possible. Remove all jewelry including rings and body piercing jewelry. Wear causal clothing that is easy to take on and off. Consider your type of surgery. Keep any valuables, jewelry, piercings at home. Please bring any specially ordered equipment (sling, braces) if indicated. Arrange for a responsible person to drive you to and from the hospital on the day of your surgery. Visitor Guidelines discussed. Call the surgeon's office with any new illnesses, exposures, or additional questions prior to surgery. Please reference your [facility] Surgical Experience Booklet” for additional information to prepare for your upcoming surgery.

## 2023-07-27 ENCOUNTER — ANESTHESIA EVENT (OUTPATIENT)
Dept: PERIOP | Facility: HOSPITAL | Age: 33
End: 2023-07-27
Payer: COMMERCIAL

## 2023-07-27 ENCOUNTER — ANESTHESIA (OUTPATIENT)
Dept: PERIOP | Facility: HOSPITAL | Age: 33
End: 2023-07-27
Payer: COMMERCIAL

## 2023-07-27 ENCOUNTER — HOSPITAL ENCOUNTER (OUTPATIENT)
Facility: HOSPITAL | Age: 33
Setting detail: OUTPATIENT SURGERY
Discharge: HOME | End: 2023-07-27
Attending: OBSTETRICS & GYNECOLOGY | Admitting: OBSTETRICS & GYNECOLOGY
Payer: COMMERCIAL

## 2023-07-27 VITALS
HEART RATE: 78 BPM | RESPIRATION RATE: 31 BRPM | DIASTOLIC BLOOD PRESSURE: 91 MMHG | TEMPERATURE: 97.6 F | SYSTOLIC BLOOD PRESSURE: 148 MMHG | OXYGEN SATURATION: 98 %

## 2023-07-27 DIAGNOSIS — N87.1 CIN II (CERVICAL INTRAEPITHELIAL NEOPLASIA II): ICD-10-CM

## 2023-07-27 LAB
EXT PREGNANCY TEST URINE: NEGATIVE
EXT. CONTROL: NORMAL

## 2023-07-27 PROCEDURE — 88307 TISSUE EXAM BY PATHOLOGIST: CPT | Performed by: SPECIALIST

## 2023-07-27 PROCEDURE — 88305 TISSUE EXAM BY PATHOLOGIST: CPT | Performed by: SPECIALIST

## 2023-07-27 PROCEDURE — 57522 CONIZATION OF CERVIX: CPT | Performed by: OBSTETRICS & GYNECOLOGY

## 2023-07-27 PROCEDURE — 88344 IMHCHEM/IMCYTCHM EA MLT ANTB: CPT | Performed by: SPECIALIST

## 2023-07-27 PROCEDURE — 81025 URINE PREGNANCY TEST: CPT | Performed by: OBSTETRICS & GYNECOLOGY

## 2023-07-27 RX ORDER — MAGNESIUM HYDROXIDE 1200 MG/15ML
LIQUID ORAL AS NEEDED
Status: DISCONTINUED | OUTPATIENT
Start: 2023-07-27 | End: 2023-07-27 | Stop reason: HOSPADM

## 2023-07-27 RX ORDER — KETOROLAC TROMETHAMINE 30 MG/ML
INJECTION, SOLUTION INTRAMUSCULAR; INTRAVENOUS AS NEEDED
Status: DISCONTINUED | OUTPATIENT
Start: 2023-07-27 | End: 2023-07-27

## 2023-07-27 RX ORDER — ACETAMINOPHEN 325 MG/1
975 TABLET ORAL EVERY 6 HOURS PRN
Status: DISCONTINUED | OUTPATIENT
Start: 2023-07-27 | End: 2023-07-27 | Stop reason: HOSPADM

## 2023-07-27 RX ORDER — LIDOCAINE HYDROCHLORIDE AND EPINEPHRINE 10; 10 MG/ML; UG/ML
INJECTION, SOLUTION INFILTRATION; PERINEURAL AS NEEDED
Status: DISCONTINUED | OUTPATIENT
Start: 2023-07-27 | End: 2023-07-27 | Stop reason: HOSPADM

## 2023-07-27 RX ORDER — DEXMEDETOMIDINE HYDROCHLORIDE 100 UG/ML
INJECTION, SOLUTION INTRAVENOUS AS NEEDED
Status: DISCONTINUED | OUTPATIENT
Start: 2023-07-27 | End: 2023-07-27

## 2023-07-27 RX ORDER — MIDAZOLAM HYDROCHLORIDE 2 MG/2ML
INJECTION, SOLUTION INTRAMUSCULAR; INTRAVENOUS AS NEEDED
Status: DISCONTINUED | OUTPATIENT
Start: 2023-07-27 | End: 2023-07-27

## 2023-07-27 RX ORDER — PROPOFOL 10 MG/ML
INJECTION, EMULSION INTRAVENOUS AS NEEDED
Status: DISCONTINUED | OUTPATIENT
Start: 2023-07-27 | End: 2023-07-27

## 2023-07-27 RX ORDER — FENTANYL CITRATE/PF 50 MCG/ML
25 SYRINGE (ML) INJECTION
Status: DISCONTINUED | OUTPATIENT
Start: 2023-07-27 | End: 2023-07-27 | Stop reason: HOSPADM

## 2023-07-27 RX ORDER — ONDANSETRON 2 MG/ML
INJECTION INTRAMUSCULAR; INTRAVENOUS AS NEEDED
Status: DISCONTINUED | OUTPATIENT
Start: 2023-07-27 | End: 2023-07-27

## 2023-07-27 RX ORDER — LIDOCAINE HYDROCHLORIDE 20 MG/ML
INJECTION, SOLUTION EPIDURAL; INFILTRATION; INTRACAUDAL; PERINEURAL AS NEEDED
Status: DISCONTINUED | OUTPATIENT
Start: 2023-07-27 | End: 2023-07-27

## 2023-07-27 RX ORDER — PROPOFOL 10 MG/ML
INJECTION, EMULSION INTRAVENOUS CONTINUOUS PRN
Status: DISCONTINUED | OUTPATIENT
Start: 2023-07-27 | End: 2023-07-27

## 2023-07-27 RX ORDER — SODIUM CHLORIDE, SODIUM LACTATE, POTASSIUM CHLORIDE, CALCIUM CHLORIDE 600; 310; 30; 20 MG/100ML; MG/100ML; MG/100ML; MG/100ML
INJECTION, SOLUTION INTRAVENOUS CONTINUOUS PRN
Status: DISCONTINUED | OUTPATIENT
Start: 2023-07-27 | End: 2023-07-27

## 2023-07-27 RX ADMIN — DEXMEDETOMIDINE 8 MCG: 100 INJECTION, SOLUTION, CONCENTRATE INTRAVENOUS at 09:33

## 2023-07-27 RX ADMIN — KETOROLAC TROMETHAMINE 30 MG: 30 INJECTION, SOLUTION INTRAMUSCULAR; INTRAVENOUS at 09:43

## 2023-07-27 RX ADMIN — DEXMEDETOMIDINE 8 MCG: 100 INJECTION, SOLUTION, CONCENTRATE INTRAVENOUS at 09:23

## 2023-07-27 RX ADMIN — SODIUM CHLORIDE, SODIUM LACTATE, POTASSIUM CHLORIDE, AND CALCIUM CHLORIDE: .6; .31; .03; .02 INJECTION, SOLUTION INTRAVENOUS at 09:00

## 2023-07-27 RX ADMIN — PROPOFOL 30 MG: 10 INJECTION, EMULSION INTRAVENOUS at 09:23

## 2023-07-27 RX ADMIN — LIDOCAINE HYDROCHLORIDE 100 MG: 20 INJECTION, SOLUTION EPIDURAL; INFILTRATION; INTRACAUDAL; PERINEURAL at 09:23

## 2023-07-27 RX ADMIN — FENTANYL CITRATE 25 MCG: 50 INJECTION INTRAMUSCULAR; INTRAVENOUS at 10:29

## 2023-07-27 RX ADMIN — PROPOFOL 20 MG: 10 INJECTION, EMULSION INTRAVENOUS at 09:33

## 2023-07-27 RX ADMIN — PROPOFOL 120 MCG/KG/MIN: 10 INJECTION, EMULSION INTRAVENOUS at 09:23

## 2023-07-27 RX ADMIN — ONDANSETRON 4 MG: 2 INJECTION INTRAMUSCULAR; INTRAVENOUS at 09:23

## 2023-07-27 RX ADMIN — FENTANYL CITRATE 25 MCG: 50 INJECTION INTRAMUSCULAR; INTRAVENOUS at 10:17

## 2023-07-27 RX ADMIN — MIDAZOLAM 2 MG: 1 INJECTION INTRAMUSCULAR; INTRAVENOUS at 09:23

## 2023-07-27 RX ADMIN — MIDAZOLAM 2 MG: 1 INJECTION INTRAMUSCULAR; INTRAVENOUS at 09:17

## 2023-07-27 NOTE — ANESTHESIA PREPROCEDURE EVALUATION
Procedure:  BIOPSY LEEP CERVIX (Cervix)    Relevant Problems   ANESTHESIA (within normal limits)      MUSCULOSKELETAL   (+) Fibromyalgia      NEURO/PSYCH   (+) Fibromyalgia   (+) VANITA (generalized anxiety disorder)   (+) Panic attacks        Physical Exam    Airway    Mallampati score: I  TM Distance: >3 FB  Neck ROM: full     Dental   No notable dental hx     Cardiovascular  Cardiovascular exam normal    Pulmonary  Pulmonary exam normal     Other Findings        Anesthesia Plan  ASA Score- 2     Anesthesia Type- IV sedation with anesthesia with ASA Monitors. Additional Monitors:   Airway Plan:     Comment: I discussed risks (reviewed with patient on the anesthesia consent form), benefits and alternatives of monitored sedation including the possibility under sedation to have recall or mild discomfort. .       Plan Factors-    Chart reviewed. Patient summary reviewed. Induction- intravenous. Postoperative Plan- Plan for postoperative opioid use. Informed Consent- Anesthetic plan and risks discussed with patient. I personally reviewed this patient with the CRNA. Discussed and agreed on the Anesthesia Plan with the CRNA. Kailash Chavira

## 2023-07-27 NOTE — DISCHARGE INSTR - AVS FIRST PAGE
Please relax for the next 12-24 hours. It is okay to shower. You may take Tylenol (acetaminophen) 500mg every 6 hours. You have a prescription for Tramadol from your pain medicine doctor and can take that if needed for pain as well. Bleeding is to be expected, call office if bleeding heavier than a period. Spotting and light bleeding or discharge may last up to 4-6 weeks while your cervix heals. No baths/swimming, tampons or intercourse until seen for follow up in office. Please call office if you develop fever > 100.5, chills, severe abdominal pain not improved with medications above, vomiting, or heavy bleeding (heavier than a period).

## 2023-07-27 NOTE — INTERVAL H&P NOTE
H&P reviewed. After examining the patient I find no changes in the patients condition since the H&P had been written. LMP > 1 year ago - on continuous OCPs.     Vitals:    07/27/23 0824   BP: 143/100   Pulse: 74   Resp: 18   Temp: 98.6 °F (37 °C)   SpO2: 100%

## 2023-07-27 NOTE — ANESTHESIA POSTPROCEDURE EVALUATION
Post-Op Assessment Note    CV Status:  Stable  Pain Score: 0    Pain management: adequate     Mental Status:  Sleepy   Hydration Status:  Stable   PONV Controlled:  None   Airway Patency:  Patent      Post Op Vitals Reviewed: Yes      Staff: CRNA         No notable events documented.     BP   121/76   Temp      Pulse  84   Resp   12   SpO2   99

## 2023-08-01 PROCEDURE — 88305 TISSUE EXAM BY PATHOLOGIST: CPT | Performed by: SPECIALIST

## 2023-08-01 PROCEDURE — 88344 IMHCHEM/IMCYTCHM EA MLT ANTB: CPT | Performed by: SPECIALIST

## 2023-08-01 PROCEDURE — 88307 TISSUE EXAM BY PATHOLOGIST: CPT | Performed by: SPECIALIST

## 2023-08-16 ENCOUNTER — OFFICE VISIT (OUTPATIENT)
Dept: FAMILY MEDICINE CLINIC | Facility: CLINIC | Age: 33
End: 2023-08-16
Payer: COMMERCIAL

## 2023-08-16 VITALS
HEIGHT: 67 IN | SYSTOLIC BLOOD PRESSURE: 158 MMHG | HEART RATE: 98 BPM | OXYGEN SATURATION: 99 % | RESPIRATION RATE: 16 BRPM | BODY MASS INDEX: 27.47 KG/M2 | DIASTOLIC BLOOD PRESSURE: 98 MMHG | WEIGHT: 175 LBS | TEMPERATURE: 98 F

## 2023-08-16 DIAGNOSIS — Z00.00 ENCOUNTER FOR WELLNESS EXAMINATION IN ADULT: Primary | ICD-10-CM

## 2023-08-16 DIAGNOSIS — F41.1 GAD (GENERALIZED ANXIETY DISORDER): ICD-10-CM

## 2023-08-16 DIAGNOSIS — E53.8 VITAMIN B12 DEFICIENCY: ICD-10-CM

## 2023-08-16 DIAGNOSIS — I10 PRIMARY HYPERTENSION: ICD-10-CM

## 2023-08-16 DIAGNOSIS — Z90.3 POSTGASTRECTOMY MALABSORPTION: ICD-10-CM

## 2023-08-16 DIAGNOSIS — K91.2 POSTGASTRECTOMY MALABSORPTION: ICD-10-CM

## 2023-08-16 PROCEDURE — 99395 PREV VISIT EST AGE 18-39: CPT | Performed by: FAMILY MEDICINE

## 2023-08-16 RX ORDER — ALPRAZOLAM 0.5 MG/1
0.5 TABLET ORAL DAILY PRN
Qty: 30 TABLET | Refills: 5 | Status: SHIPPED | OUTPATIENT
Start: 2023-08-16

## 2023-08-16 RX ORDER — CYANOCOBALAMIN 1000 UG/ML
INJECTION, SOLUTION INTRAMUSCULAR; SUBCUTANEOUS
Qty: 6 ML | Refills: 3 | Status: SHIPPED | OUTPATIENT
Start: 2023-08-16

## 2023-08-16 RX ORDER — AMLODIPINE BESYLATE 5 MG/1
5 TABLET ORAL DAILY
Qty: 30 TABLET | Refills: 3 | Status: SHIPPED | OUTPATIENT
Start: 2023-08-16

## 2023-08-16 NOTE — PROGRESS NOTES
Name: Joanne Clemons      : 1990      MRN: [id number]  Encounter Provider: Thomas Velarde MD  Encounter Date: 2023   Encounter department: [address] [place] [address]     1. Encounter for wellness examination in adult    2. Primary hypertension  Assessment & Plan:  Blood pressure is elevated. Possibly stress-induced. Family history of hypertension. Start amlodipine 5 mg daily, patient will monitor BP at work and will update me in a few weeks. Follow-up in the office in 2 months. Patient remains on combined oral contraceptives but desires permanent sterilization. I will reach out to her gynecologist, Dr. Jj Mustafa to coordinate clinical care. Patient had difficulties with Nexplanon and IUD in the past      Orders:  -     amLODIPine (NORVASC) 5 mg tablet; Take 1 tablet (5 mg total) by mouth daily    3. Postgastrectomy malabsorption  -     CBC and differential; Future  -     Comprehensive metabolic panel; Future  -     Lipid Panel with Direct LDL reflex; Future  -     TSH, 3rd generation; Future  -     Hemoglobin A1C; Future  -     Vitamin B12; Future  -     Vitamin D 25 hydroxy; Future  -     Vitamin A; Future  -     Iron Panel (Includes Ferritin, Iron Sat%, Iron, and TIBC); Future  -     CBC and differential  -     Comprehensive metabolic panel  -     Lipid Panel with Direct LDL reflex  -     TSH, 3rd generation  -     Hemoglobin A1C  -     Vitamin B12  -     Vitamin D 25 hydroxy  -     Vitamin A    4. VANITA (generalized anxiety disorder)  Assessment & Plan:  Intolerant to SSRIs, unable to take due to chronic tramadol use by pain management for treatment of fibromyalgia. Patient remains on amitriptyline 50 mg nightly and uses Xanax sparingly as needed. Orders:  -     ALPRAZolam (XANAX) 0.5 mg tablet; Take 1 tablet (0.5 mg total) by mouth daily as needed for anxiety    5. Vitamin B12 deficiency  -     cyanocobalamin 1,000 mcg/mL; Inject 1 mL every 14 days. Return in about 2 months (around 10/23/2023) for follow up. Depression Screening and Follow-up Plan: Patient was screened for depression during today's encounter. They screened negative with a PHQ-2 score of 0. Subjective     Annual well exam/follow-up. Patient has been feeling generally well. She reports that her blood pressure was elevated on a few occasions during office visits with pain management. She is asymptomatic, on birth control pills, no headaches, chest pain, palpitations or dizziness. Patient reports being under significant stress lately, work-related. Sleep is broken but has improved with a job change as of 2 weeks ago. Recent LEEP - NIURKA 2 by GYN- - f/up  6 weeks , HPV positive  Previously used atenolol for symptoms of anxiety and borderline BP, medication was discontinued due to side effects of decreased sex drive and fatigue. Patient is due for routine blood work, history of gastric bypass surgery    Review of Systems   Constitutional: Negative. HENT: Negative. Eyes: Negative. Respiratory: Negative. Cardiovascular: Negative. Gastrointestinal: Negative. Endocrine: Negative. Genitourinary: Negative. Musculoskeletal: Positive for arthralgias and myalgias. Skin: Negative. Allergic/Immunologic: Negative. Neurological: Negative. Hematological: Negative. Psychiatric/Behavioral: Positive for sleep disturbance. The patient is nervous/anxious.         Past Medical History:   Diagnosis Date   • Abnormal Pap smear of cervix    • ADHD    • Anxiety    • Claustrophobia    • Fibromyalgia     seeing pain management   • History of anemia    • History of ovarian cyst    • HPV (human papilloma virus) infection     early 20s   • Pain management     *narcotics to be reviewed with pain managment provider   • PCOS (polycystic ovarian syndrome)    • Vitamin B12 deficiency      Past Surgical History:   Procedure Laterality Date   • AUGMENTATION BREAST  2021   • BREAST SURGERY  12/27/2021   • DE CONIZATION CERVIX W/WO D&C RPR ELTRD EXC N/A 07/27/2023    Procedure: BIOPSY LEEP CERVIX;  Surgeon: Rosalia Ayon MD;  Location:  MAIN OR;  Service: Gynecology   • CHINTAN-EN-Y PROCEDURE  2009     Family History   Problem Relation Age of Onset   • Diabetes Mother    • Breast cancer Mother         Invasive lobular carcinoma   • Breast cancer Maternal Grandmother          mid 63's   • Diabetes Maternal Grandmother    • Lung cancer Maternal Grandmother    • Hypertension Paternal Grandmother    • Hearing loss Paternal Grandmother    • Anxiety disorder Paternal Grandmother    • Depression Maternal Grandfather    • Depression Paternal Grandfather      Social History     Socioeconomic History   • Marital status: /Civil Union     Spouse name: None   • Number of children: None   • Years of education: None   • Highest education level: None   Occupational History     Employer: [employer]   Tobacco Use   • Smoking status: Never     Passive exposure: Never   • Smokeless tobacco: Never   Vaping Use   • Vaping Use: Never used   Substance and Sexual Activity   • Alcohol use: No   • Drug use: No   • Sexual activity: Yes     Partners: Male     Birth control/protection: OCP   Other Topics Concern   • None   Social History Narrative   • None     Social Determinants of Health     Financial Resource Strain: Not on file   Food Insecurity: Not on file   Transportation Needs: Not on file   Physical Activity: Not on file   Stress: Not on file   Social Connections: Not on file   Intimate Partner Violence: Not on file   Housing Stability: Not on file     Current Outpatient Medications on File Prior to Visit   Medication Sig   • amitriptyline (ELAVIL) 50 mg tablet Take 1 tablet by mouth daily at bedtime   • bimatoprost (LATISSE) 0.03 % ophthalmic solution INSTILL 1 DROP INTO EACH EYE IN THE PM--DE SENT 1/18/21   • busPIRone (BUSPAR) 5 mg tablet Take 1 tab twice a day for 7 days PRN : anxiety, OK  to increase dose afterwards to 10 mg BID   • naloxone (NARCAN) 4 mg/0.1 mL nasal spray Administer 1 spray into a nostril. If no response after 2-3 minutes, give another dose in the other nostril using a new spray. • norethindrone-ethinyl estradiol (Nortrel 1/35, 21,) 1-35 MG-MCG per tablet Take 1 tablet at same time daily. • SYRINGE-NEEDLE, DISP, 3 ML (B-D 3CC LUER-SHANIQUA SYR 25GX5/8") 25G X 5/8" 3 ML MISC Inject vitamin B12 IM every 2 weeks   • traMADol (ULTRAM) 50 mg tablet Take 50 mg by mouth 4 (four) times a day       Allergies   Allergen Reactions   • Sulfa Antibiotics Rash   • Nsaids GI Intolerance     S/p gastric bypass surgery   • Propoxyphene Nausea Only     Immunization History   Administered Date(s) Administered   • COVID-19 MODERNA VACC 0.5 ML IM 01/26/2021, 02/25/2021, 11/12/2021   • Meningococcal MCV4P 03/26/2021   • Tdap 03/28/2017       Objective     /98 (BP Location: Left arm, Patient Position: Sitting, Cuff Size: Standard)   Pulse 98   Temp 98 °F (36.7 °C) (Temporal)   Resp 16   Ht 5' 7" (1.702 m)   Wt 79.4 kg (175 lb)   LMP 01/01/2022 (Approximate)   SpO2 99%   BMI 27.41 kg/m²     Physical Exam  Vitals and nursing note reviewed. Constitutional:       General: She is not in acute distress. Appearance: Normal appearance. She is well-developed. She is not ill-appearing. HENT:      Head: Normocephalic and atraumatic. Eyes:      Conjunctiva/sclera: Conjunctivae normal.   Neck:      Thyroid: No thyromegaly. Vascular: No carotid bruit. Cardiovascular:      Rate and Rhythm: Normal rate and regular rhythm. Heart sounds: Normal heart sounds. No murmur heard. Pulmonary:      Effort: Pulmonary effort is normal. No respiratory distress. Breath sounds: Normal breath sounds. No wheezing. Abdominal:      General: There is no distension or abdominal bruit. Palpations: Abdomen is soft. Tenderness: There is no abdominal tenderness.       Hernia: No hernia is present. Musculoskeletal:         General: Normal range of motion. Cervical back: Neck supple. No rigidity. Right lower leg: No edema. Left lower leg: No edema. Skin:     General: Skin is warm. Neurological:      General: No focal deficit present. Mental Status: She is alert and oriented to person, place, and time. Cranial Nerves: No cranial nerve deficit. Coordination: Coordination normal.   Psychiatric:         Mood and Affect: Mood normal.         Behavior: Behavior normal.         Thought Content:  Thought content normal.       Yamini Moss MD

## 2023-08-23 PROBLEM — I10 PRIMARY HYPERTENSION: Chronic | Status: ACTIVE | Noted: 2022-05-14

## 2023-08-23 NOTE — ASSESSMENT & PLAN NOTE
Intolerant to SSRIs, unable to take due to chronic tramadol use by pain management for treatment of fibromyalgia. Patient remains on amitriptyline 50 mg nightly and uses Xanax sparingly as needed.

## 2023-08-23 NOTE — ASSESSMENT & PLAN NOTE
Blood pressure is elevated. Possibly stress-induced. Family history of hypertension. Start amlodipine 5 mg daily, patient will monitor BP at work and will update me in a few weeks. Follow-up in the office in 2 months. Patient remains on combined oral contraceptives but desires permanent sterilization. I will reach out to her gynecologist, Dr. Suhas Powres to coordinate clinical care.   Patient had difficulties with Nexplanon and IUD in the past

## 2023-09-07 DIAGNOSIS — I10 PRIMARY HYPERTENSION: ICD-10-CM

## 2023-09-07 RX ORDER — AMLODIPINE BESYLATE 5 MG/1
5 TABLET ORAL DAILY
Qty: 90 TABLET | Refills: 2 | Status: SHIPPED | OUTPATIENT
Start: 2023-09-07

## 2023-09-09 PROBLEM — Z98.890 HISTORY OF LOOP ELECTRICAL EXCISION PROCEDURE (LEEP): Status: ACTIVE | Noted: 2023-07-06

## 2023-09-09 NOTE — ASSESSMENT & PLAN NOTE
7/27/23 LEEP - NIURKA 2 with negative margins; ECC did not survive processing. Doing well, no complaints. Normal exam with patent cervix. Pt now on amlodipine for HTN with OCPs. /84 today. Interested in alternatives. Did not do well in past with Nexplanon. Discussed option of progesterone IUD which would be ideal for this patient with previous gastric bypass surgery. Risks of anesthesia and surgery reviewed. Will verify coverage and contact for appointment to either discuss with Dr Jj Mustafa per request or for insertion. Agrees to plan.

## 2023-09-09 NOTE — PROGRESS NOTES
Assessment/Plan:    History of loop electrical excision procedure (LEEP)  7/27/23 NIURKA II   7/27/23 LEEP - NIURKA 2 with negative margins; ECC did not survive processing. Doing well, no complaints. Normal exam with patent cervix. Pt now on amlodipine for HTN with OCPs. /84 today. Interested in alternatives. Did not do well in past with Nexplanon. Discussed option of progesterone IUD which would be ideal for this patient with previous gastric bypass surgery. Risks of anesthesia and surgery reviewed. Will verify coverage and contact for appointment to either discuss with Dr Cassy Asher per request or for insertion. Agrees to plan. There are no diagnoses linked to this encounter. Subjective:      Patient ID: James Murray is a 34 y.o. female. HPI Here for post op LEEP exam, no complaints    The following portions of the patient's history were reviewed and updated as appropriate:   She  has a past medical history of Abnormal Pap smear of cervix, ADHD, Anxiety, Claustrophobia, Fibromyalgia, History of anemia, History of ovarian cyst, HPV (human papilloma virus) infection, Pain management, PCOS (polycystic ovarian syndrome), and Vitamin B12 deficiency. She   Patient Active Problem List    Diagnosis Date Noted   • History of loop electrical excision procedure (LEEP)  7/27/23 NIURKA II  07/06/2023   • Primary hypertension 05/14/2022   • 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   • Encounter for gynecological examination (general) (routine) without abnormal findings 01/21/2019   • VANITA (generalized anxiety disorder) 08/13/2018   • Palpitations 08/13/2018   • Allergic rhinitis 10/03/2017   • Low vitamin D level 02/16/2016   • Panic attacks 02/16/2016   • Vitamin B12 deficiency 02/16/2016   • Oligomenorrhea 10/09/2015   • Fibromyalgia 04/15/2013   • ADHD 01/01/1999     She  has a past surgical history that includes Amina-en-y procedure (2009);  AUGMENTATION BREAST (2021); pr conization cervix w/wo d&c rpr eltrd exc (N/A, 07/27/2023); and Breast surgery (12/27/2021). Her family history includes Anxiety disorder in her paternal grandmother; Breast cancer in her maternal grandmother and mother; Depression in her maternal grandfather and paternal grandfather; Diabetes in her maternal grandmother and mother; Hearing loss in her paternal grandmother; Hypertension in her paternal grandmother; Lung cancer in her maternal grandmother. She  reports that she has never smoked. She has never been exposed to tobacco smoke. She has never used smokeless tobacco. She reports that she does not drink alcohol and does not use drugs. Current Outpatient Medications   Medication Sig Dispense Refill   • ALPRAZolam (XANAX) 0.5 mg tablet Take 1 tablet (0.5 mg total) by mouth daily as needed for anxiety 30 tablet 5   • amitriptyline (ELAVIL) 50 mg tablet Take 1 tablet by mouth daily at bedtime     • amLODIPine (NORVASC) 5 mg tablet TAKE 1 TABLET (5 MG TOTAL) BY MOUTH DAILY. 90 tablet 2   • bimatoprost (LATISSE) 0.03 % ophthalmic solution INSTILL 1 DROP INTO EACH EYE IN THE PM--OK SENT 1/18/21     • busPIRone (BUSPAR) 5 mg tablet Take 1 tab twice a day for 7 days PRN : anxiety, OK  to increase dose afterwards to 10 mg BID 90 tablet 5   • cyanocobalamin 1,000 mcg/mL Inject 1 mL every 14 days. 6 mL 3   • naloxone (NARCAN) 4 mg/0.1 mL nasal spray Administer 1 spray into a nostril. If no response after 2-3 minutes, give another dose in the other nostril using a new spray. 1 each 1   • norethindrone-ethinyl estradiol (Nortrel 1/35, 21,) 1-35 MG-MCG per tablet Take 1 tablet at same time daily. 84 tablet 1   • SYRINGE-NEEDLE, DISP, 3 ML (B-D 3CC LUER-SHANIQUA SYR 25GX5/8") 25G X 5/8" 3 ML MISC Inject vitamin B12 IM every 2 weeks 6 each 3   • traMADol (ULTRAM) 50 mg tablet Take 50 mg by mouth 4 (four) times a day    3     No current facility-administered medications for this visit.      She is allergic to sulfa antibiotics, nsaids, and propoxyphene. .    Review of Systems  no continued bleeding    Objective:      /84 (BP Location: Left arm, Patient Position: Sitting, Cuff Size: Standard)   Ht 5' 7" (1.702 m)   Wt 78.9 kg (174 lb)   LMP 01/01/2022 (Approximate)   BMI 27.25 kg/m²          Physical Exam    Appears well, no apparent distress. Does not appear anxious or depressed.   Pelvic exam: normal external genitalia, vagina normal no abnormal discharge, cervix well healed, cytobrush passed to internal os with ease

## 2023-09-11 ENCOUNTER — OFFICE VISIT (OUTPATIENT)
Dept: OBGYN CLINIC | Facility: CLINIC | Age: 33
End: 2023-09-11

## 2023-09-11 VITALS
WEIGHT: 174 LBS | SYSTOLIC BLOOD PRESSURE: 124 MMHG | BODY MASS INDEX: 27.31 KG/M2 | HEIGHT: 67 IN | DIASTOLIC BLOOD PRESSURE: 84 MMHG

## 2023-09-11 DIAGNOSIS — Z98.890 HISTORY OF LOOP ELECTRICAL EXCISION PROCEDURE (LEEP): Primary | ICD-10-CM

## 2023-09-11 PROCEDURE — 99024 POSTOP FOLLOW-UP VISIT: CPT | Performed by: OBSTETRICS & GYNECOLOGY

## 2023-09-11 NOTE — PATIENT INSTRUCTIONS
Remember to eat a meal, drink 2 glasses of water and to take tylenol one hour prior to your IUD appointment.

## 2023-10-09 ENCOUNTER — PROCEDURE VISIT (OUTPATIENT)
Dept: OBGYN CLINIC | Facility: CLINIC | Age: 33
End: 2023-10-09
Payer: COMMERCIAL

## 2023-10-09 VITALS
DIASTOLIC BLOOD PRESSURE: 80 MMHG | WEIGHT: 177 LBS | HEIGHT: 67 IN | SYSTOLIC BLOOD PRESSURE: 144 MMHG | BODY MASS INDEX: 27.78 KG/M2

## 2023-10-09 DIAGNOSIS — Z30.430 ENCOUNTER FOR IUD INSERTION: ICD-10-CM

## 2023-10-09 DIAGNOSIS — Z32.02 PREGNANCY EXAMINATION OR TEST, NEGATIVE RESULT: Primary | ICD-10-CM

## 2023-10-09 LAB — SL AMB POCT URINE HCG: NEGATIVE

## 2023-10-09 PROCEDURE — 81025 URINE PREGNANCY TEST: CPT | Performed by: OBSTETRICS & GYNECOLOGY

## 2023-10-09 PROCEDURE — 58300 INSERT INTRAUTERINE DEVICE: CPT | Performed by: OBSTETRICS & GYNECOLOGY

## 2023-10-09 NOTE — PROGRESS NOTES
Iud insertions    Date/Time: 10/9/2023 10:40 AM    Performed by: Hansel Zavaleta MD  Authorized by: Hansel Zavaleta MD    Verbal consent obtained?: Yes    Consent given by:  Patient  Patient states understanding of procedure being performed: Yes    Patient identity confirmed:  Verbally with patient  Procedure:     Negative urine pregnancy test: yes      Cervix cleaned and prepped: yes      Speculum placed in vagina: yes      Tenaculum applied to cervix: yes      Uterus sounded: yes      Uterus sound depth (cm):  7    IUD type:  Mirena    Strings trimmed: yes (3 cm)    Post-procedure:     Patient tolerated procedure well: yes      Patient will follow up after next period: yes    Comments:      Mirena inserted with ease, tolerated well.

## 2023-11-17 PROBLEM — Z97.5 IUD (INTRAUTERINE DEVICE) IN PLACE: Status: ACTIVE | Noted: 2023-11-17

## 2023-11-17 NOTE — PROGRESS NOTES
Assessment/Plan:    IUD (intrauterine device) in place - Mirena 10/9/23  No prolonged bleeding, fever or persistent pain. No concerns with string. IUD string in os  RTO for well check and pap in April Diagnoses and all orders for this visit:    IUD (intrauterine device) in place - Mirena 10/9/23    Other orders  -     traMADol (ULTRAM-ER) 100 mg 24 hr tablet; Take 100 mg by mouth daily          Subjective:      Patient ID: Lan Negron is a 28 y.o. female. HPI  Here for IUD check. The following portions of the patient's history were reviewed and updated as appropriate: She  has a past medical history of Abnormal Pap smear of cervix, ADHD, Anxiety, Claustrophobia, Fibromyalgia, History of anemia, History of ovarian cyst, HPV (human papilloma virus) infection, Pain management, PCOS (polycystic ovarian syndrome), and Vitamin B12 deficiency. She   Patient Active Problem List    Diagnosis Date Noted    IUD (intrauterine device) in place - Mirena 10/9/23 11/17/2023    History of loop electrical excision procedure (LEEP)  7/27/23 NIURKA II  07/06/2023    Primary hypertension 05/14/2022    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    Encounter for gynecological examination (general) (routine) without abnormal findings 01/21/2019    VANITA (generalized anxiety disorder) 08/13/2018    Palpitations 08/13/2018    Allergic rhinitis 10/03/2017    Low vitamin D level 02/16/2016    Panic attacks 02/16/2016    Vitamin B12 deficiency 02/16/2016    Oligomenorrhea 10/09/2015    Fibromyalgia 04/15/2013    ADHD 01/01/1999     She  has a past surgical history that includes Amina-en-y procedure (2009); AUGMENTATION BREAST (2021); pr conization cervix w/wo d&c rpr eltrd exc (N/A, 07/27/2023); and Breast surgery (12/27/2021).   Her family history includes Anxiety disorder in her paternal grandmother; Breast cancer in her maternal grandmother and mother; Depression in her maternal grandfather and paternal grandfather; Diabetes in her maternal grandmother and mother; Hearing loss in her paternal grandmother; Hypertension in her paternal grandmother; Lung cancer in her maternal grandmother. She  reports that she has never smoked. She has never been exposed to tobacco smoke. She has never used smokeless tobacco. She reports that she does not drink alcohol and does not use drugs. Current Outpatient Medications   Medication Sig Dispense Refill    ALPRAZolam (XANAX) 0.5 mg tablet Take 1 tablet (0.5 mg total) by mouth daily as needed for anxiety 30 tablet 5    amitriptyline (ELAVIL) 50 mg tablet Take 1 tablet by mouth daily at bedtime      amLODIPine (NORVASC) 5 mg tablet TAKE 1 TABLET (5 MG TOTAL) BY MOUTH DAILY. 90 tablet 2    bimatoprost (LATISSE) 0.03 % ophthalmic solution INSTILL 1 DROP INTO EACH EYE IN THE PM--[place] SENT 1/18/21      busPIRone (BUSPAR) 5 mg tablet Take 1 tab twice a day for 7 days PRN : anxiety, OK  to increase dose afterwards to 10 mg BID 90 tablet 5    cyanocobalamin 1,000 mcg/mL Inject 1 mL every 14 days. 6 mL 3    naloxone (NARCAN) 4 mg/0.1 mL nasal spray Administer 1 spray into a nostril. If no response after 2-3 minutes, give another dose in the other nostril using a new spray. 1 each 1    SYRINGE-NEEDLE, DISP, 3 ML (B-D 3CC LUER-SHANIQUA SYR 25GX5/8") 25G X 5/8" 3 ML MISC Inject vitamin B12 IM every 2 weeks 6 each 3    traMADol (ULTRAM) 50 mg tablet Take 50 mg by mouth 4 (four) times a day  3    traMADol (ULTRAM-ER) 100 mg 24 hr tablet Take 100 mg by mouth daily       No current facility-administered medications for this visit. She is allergic to sulfa antibiotics, nsaids, and propoxyphene. .    Review of Systems  No prolonged bleeding, fever or persistent pain. No concerns with string. Objective:      /70 (BP Location: Left arm, Patient Position: Sitting, Cuff Size: Standard)   Ht 5' 7" (1.702 m)   Wt 77.6 kg (171 lb)   BMI 26.78 kg/m²          Physical Exam    Appears well, no apparent distress.    Does not appear anxious or depressed.   Pelvic: normal external genitalia, vagina without lesions, cervix with IUD string seen in external os

## 2023-11-20 ENCOUNTER — OFFICE VISIT (OUTPATIENT)
Dept: OBGYN CLINIC | Facility: CLINIC | Age: 33
End: 2023-11-20
Payer: COMMERCIAL

## 2023-11-20 VITALS
DIASTOLIC BLOOD PRESSURE: 70 MMHG | BODY MASS INDEX: 26.84 KG/M2 | SYSTOLIC BLOOD PRESSURE: 118 MMHG | HEIGHT: 67 IN | WEIGHT: 171 LBS

## 2023-11-20 DIAGNOSIS — Z97.5 IUD (INTRAUTERINE DEVICE) IN PLACE: Primary | ICD-10-CM

## 2023-11-20 PROCEDURE — 99212 OFFICE O/P EST SF 10 MIN: CPT | Performed by: OBSTETRICS & GYNECOLOGY

## 2023-11-20 RX ORDER — TRAMADOL HYDROCHLORIDE 100 MG/1
100 TABLET, EXTENDED RELEASE ORAL DAILY
COMMUNITY
Start: 2023-11-09

## 2023-11-20 NOTE — ASSESSMENT & PLAN NOTE
No prolonged bleeding, fever or persistent pain. No concerns with string.   IUD string in os  RTO for well check and pap in April

## 2023-12-21 ENCOUNTER — OFFICE VISIT (OUTPATIENT)
Dept: URGENT CARE | Facility: CLINIC | Age: 33
End: 2023-12-21
Payer: COMMERCIAL

## 2023-12-21 VITALS
HEART RATE: 82 BPM | OXYGEN SATURATION: 99 % | RESPIRATION RATE: 16 BRPM | SYSTOLIC BLOOD PRESSURE: 123 MMHG | DIASTOLIC BLOOD PRESSURE: 82 MMHG | TEMPERATURE: 98 F

## 2023-12-21 DIAGNOSIS — J40 BRONCHITIS: ICD-10-CM

## 2023-12-21 DIAGNOSIS — J02.9 ACUTE PHARYNGITIS, UNSPECIFIED ETIOLOGY: Primary | ICD-10-CM

## 2023-12-21 LAB — S PYO AG THROAT QL: NEGATIVE

## 2023-12-21 PROCEDURE — 99213 OFFICE O/P EST LOW 20 MIN: CPT | Performed by: PHYSICIAN ASSISTANT

## 2023-12-21 PROCEDURE — 87880 STREP A ASSAY W/OPTIC: CPT | Performed by: PHYSICIAN ASSISTANT

## 2023-12-21 RX ORDER — AZITHROMYCIN 250 MG/1
TABLET, FILM COATED ORAL
Qty: 6 TABLET | Refills: 0 | Status: SHIPPED | OUTPATIENT
Start: 2023-12-21 | End: 2023-12-25

## 2023-12-21 RX ORDER — BENZONATATE 100 MG/1
100 CAPSULE ORAL 3 TIMES DAILY PRN
Qty: 20 CAPSULE | Refills: 0 | Status: SHIPPED | OUTPATIENT
Start: 2023-12-21

## 2023-12-21 RX ORDER — FLUTICASONE PROPIONATE 50 MCG
1 SPRAY, SUSPENSION (ML) NASAL DAILY
Qty: 9.9 ML | Refills: 0 | Status: SHIPPED | OUTPATIENT
Start: 2023-12-21

## 2023-12-21 NOTE — PROGRESS NOTES
[facility]        NAME: Loretta Barksdale is a 33 y.o. female  : 1990    MRN: [id number]  DATE: 2023  TIME: 2:00 PM    /82   Pulse 82   Temp 98 °F (36.7 °C)   Resp 16   SpO2 99%     Assessment and Plan   Acute pharyngitis, unspecified etiology [J02.9]  1. Acute pharyngitis, unspecified etiology  POCT rapid strepA    azithromycin (ZITHROMAX) 250 mg tablet    benzonatate (TESSALON PERLES) 100 mg capsule    fluticasone (FLONASE) 50 mcg/act nasal spray      2. Bronchitis  azithromycin (ZITHROMAX) 250 mg tablet    benzonatate (TESSALON PERLES) 100 mg capsule    fluticasone (FLONASE) 50 mcg/act nasal spray            Patient Instructions       Follow up with PCP in 3-5 days.  Proceed to  ER if symptoms worsen.    Chief Complaint     Chief Complaint   Patient presents with    Cold Like Symptoms     Patient has had sore throat, cough, ear pain for about 10-14 days         History of Present Illness       Pt with sinus pain and congestion and sore throat         Review of Systems   Review of Systems   Constitutional: Negative.    HENT:  Positive for congestion, sinus pressure and sinus pain.    Eyes: Negative.    Respiratory:  Positive for cough.    Cardiovascular: Negative.    Gastrointestinal: Negative.    Endocrine: Negative.    Genitourinary: Negative.    Musculoskeletal: Negative.    Skin: Negative.    Allergic/Immunologic: Negative.    Neurological: Negative.    Hematological: Negative.    Psychiatric/Behavioral: Negative.     All other systems reviewed and are negative.        Current Medications       Current Outpatient Medications:     azithromycin (ZITHROMAX) 250 mg tablet, Take 2 tablets today then 1 tablet daily x 4 days, Disp: 6 tablet, Rfl: 0    benzonatate (TESSALON PERLES) 100 mg capsule, Take 1 capsule (100 mg total) by mouth 3 (three) times a day as needed for cough, Disp: 20 capsule, Rfl: 0    fluticasone (FLONASE) 50 mcg/act nasal spray, 1 spray into each nostril  "daily, Disp: 9.9 mL, Rfl: 0    ALPRAZolam (XANAX) 0.5 mg tablet, Take 1 tablet (0.5 mg total) by mouth daily as needed for anxiety, Disp: 30 tablet, Rfl: 5    amitriptyline (ELAVIL) 50 mg tablet, Take 1 tablet by mouth daily at bedtime, Disp: , Rfl:     amLODIPine (NORVASC) 5 mg tablet, TAKE 1 TABLET (5 MG TOTAL) BY MOUTH DAILY., Disp: 90 tablet, Rfl: 2    bimatoprost (LATISSE) 0.03 % ophthalmic solution, INSTILL 1 DROP INTO EACH EYE IN THE PM--[place] SENT 1/18/21, Disp: , Rfl:     busPIRone (BUSPAR) 5 mg tablet, Take 1 tab twice a day for 7 days PRN : anxiety, OK  to increase dose afterwards to 10 mg BID, Disp: 90 tablet, Rfl: 5    cyanocobalamin 1,000 mcg/mL, Inject 1 mL every 14 days., Disp: 6 mL, Rfl: 3    naloxone (NARCAN) 4 mg/0.1 mL nasal spray, Administer 1 spray into a nostril. If no response after 2-3 minutes, give another dose in the other nostril using a new spray., Disp: 1 each, Rfl: 1    SYRINGE-NEEDLE, DISP, 3 ML (B-D 3CC LUER-SHANIQUA SYR 25GX5/8\") 25G X 5/8\" 3 ML MISC, Inject vitamin B12 IM every 2 weeks, Disp: 6 each, Rfl: 3    traMADol (ULTRAM) 50 mg tablet, Take 50 mg by mouth 4 (four) times a day, Disp: , Rfl: 3    traMADol (ULTRAM-ER) 100 mg 24 hr tablet, Take 100 mg by mouth daily, Disp: , Rfl:     Current Allergies     Allergies as of 12/21/2023 - Reviewed 12/21/2023   Allergen Reaction Noted    Sulfa antibiotics Rash 04/15/2013    Nsaids GI Intolerance 07/24/2023    Propoxyphene Nausea Only 04/15/2013            The following portions of the patient's history were reviewed and updated as appropriate: allergies, current medications, past family history, past medical history, past social history, past surgical history and problem list.     Past Medical History:   Diagnosis Date    Abnormal Pap smear of cervix     ADHD     Anxiety     Claustrophobia     Fibromyalgia     seeing pain management    History of anemia     History of ovarian cyst     HPV (human papilloma virus) infection     early 20s    Pain " management     *narcotics to be reviewed with pain managment provider    PCOS (polycystic ovarian syndrome)     Vitamin B12 deficiency        Past Surgical History:   Procedure Laterality Date    AUGMENTATION BREAST  2021    BREAST SURGERY  12/27/2021    [place] CONIZATION CERVIX W/WO D&C RPR ELTRD EXC N/A 07/27/2023    Procedure: BIOPSY LEEP CERVIX;  Surgeon: Blaire Phipps MD;  Location:  MAIN OR;  Service: Gynecology    CHINTAN-EN-Y PROCEDURE  2009       Family History   Problem Relation Age of Onset    Diabetes Mother     Breast cancer Mother         Invasive lobular carcinoma    Breast cancer Maternal Grandmother          mid 60's    Diabetes Maternal Grandmother     Lung cancer Maternal Grandmother     Hypertension Paternal Grandmother     Hearing loss Paternal Grandmother     Anxiety disorder Paternal Grandmother     Depression Maternal Grandfather     Depression Paternal Grandfather          Medications have been verified.        Objective   /82   Pulse 82   Temp 98 °F (36.7 °C)   Resp 16   SpO2 99%        Physical Exam     Physical Exam  Vitals and nursing note reviewed.   Constitutional:       Appearance: Normal appearance. She is normal weight.   HENT:      Head: Normocephalic and atraumatic.      Right Ear: Tympanic membrane, ear canal and external ear normal.      Left Ear: Tympanic membrane, ear canal and external ear normal.      Nose: Congestion and rhinorrhea present.      Comments: Boggy mucosa      Mouth/Throat:      Mouth: Mucous membranes are moist.      Pharynx: Oropharynx is clear.   Eyes:      Extraocular Movements: Extraocular movements intact.      Conjunctiva/sclera: Conjunctivae normal.      Pupils: Pupils are equal, round, and reactive to light.   Cardiovascular:      Rate and Rhythm: Normal rate and regular rhythm.      Pulses: Normal pulses.      Heart sounds: Normal heart sounds.   Pulmonary:      Effort: Pulmonary effort is normal.      Breath sounds: Normal breath sounds.    Abdominal:      General: Abdomen is flat. Bowel sounds are normal.      Palpations: Abdomen is soft.   Musculoskeletal:         General: Normal range of motion.      Cervical back: Normal range of motion and neck supple.   Skin:     General: Skin is warm.      Capillary Refill: Capillary refill takes less than 2 seconds.   Neurological:      General: No focal deficit present.      Mental Status: She is alert and oriented to person, place, and time.   Psychiatric:         Mood and Affect: Mood normal.

## 2023-12-26 LAB — B-HEM STREP SPEC QL CULT: NEGATIVE

## 2024-01-27 DIAGNOSIS — F41.1 GAD (GENERALIZED ANXIETY DISORDER): ICD-10-CM

## 2024-01-29 DIAGNOSIS — E53.8 VITAMIN B12 DEFICIENCY: ICD-10-CM

## 2024-01-29 RX ORDER — NEEDLES, FILTER 19GX1 1/2"
NEEDLE, DISPOSABLE MISCELLANEOUS
Qty: 6 EACH | Refills: 3 | Status: SHIPPED | OUTPATIENT
Start: 2024-01-29

## 2024-01-29 RX ORDER — ALPRAZOLAM 0.5 MG/1
0.5 TABLET ORAL DAILY PRN
Qty: 30 TABLET | OUTPATIENT
Start: 2024-01-29

## 2024-01-29 RX ORDER — ALPRAZOLAM 0.5 MG/1
0.5 TABLET ORAL DAILY PRN
Qty: 30 TABLET | Refills: 3 | Status: SHIPPED | OUTPATIENT
Start: 2024-01-29

## 2024-02-21 PROBLEM — Z01.419 ENCOUNTER FOR GYNECOLOGICAL EXAMINATION (GENERAL) (ROUTINE) WITHOUT ABNORMAL FINDINGS: Status: RESOLVED | Noted: 2019-01-21 | Resolved: 2024-02-21

## 2024-03-20 ENCOUNTER — TELEPHONE (OUTPATIENT)
Dept: FAMILY MEDICINE CLINIC | Facility: CLINIC | Age: 34
End: 2024-03-20

## 2024-03-20 DIAGNOSIS — F41.1 GAD (GENERALIZED ANXIETY DISORDER): ICD-10-CM

## 2024-03-20 RX ORDER — ALPRAZOLAM 0.5 MG/1
0.5 TABLET ORAL DAILY PRN
Qty: 30 TABLET | Refills: 2 | Status: SHIPPED | OUTPATIENT
Start: 2024-03-20

## 2024-03-20 NOTE — TELEPHONE ENCOUNTER
Please contact patient.  I refilled alprazolam.  She should schedule follow-up appointment.  Thank you

## 2024-04-08 ENCOUNTER — OFFICE VISIT (OUTPATIENT)
Dept: FAMILY MEDICINE CLINIC | Facility: CLINIC | Age: 34
End: 2024-04-08
Payer: COMMERCIAL

## 2024-04-08 VITALS
OXYGEN SATURATION: 97 % | HEART RATE: 97 BPM | RESPIRATION RATE: 16 BRPM | TEMPERATURE: 98.2 F | WEIGHT: 177 LBS | SYSTOLIC BLOOD PRESSURE: 128 MMHG | BODY MASS INDEX: 27.78 KG/M2 | HEIGHT: 67 IN | DIASTOLIC BLOOD PRESSURE: 86 MMHG

## 2024-04-08 DIAGNOSIS — Z90.3 POSTGASTRECTOMY MALABSORPTION: ICD-10-CM

## 2024-04-08 DIAGNOSIS — K91.2 POSTGASTRECTOMY MALABSORPTION: ICD-10-CM

## 2024-04-08 DIAGNOSIS — Z87.898 HISTORY OF MOTION SICKNESS: ICD-10-CM

## 2024-04-08 DIAGNOSIS — F41.1 GAD (GENERALIZED ANXIETY DISORDER): Primary | ICD-10-CM

## 2024-04-08 DIAGNOSIS — I10 PRIMARY HYPERTENSION: Chronic | ICD-10-CM

## 2024-04-08 DIAGNOSIS — R00.2 PALPITATIONS: ICD-10-CM

## 2024-04-08 DIAGNOSIS — R74.8 ELEVATED ALKALINE PHOSPHATASE LEVEL: ICD-10-CM

## 2024-04-08 DIAGNOSIS — E53.8 B12 DEFICIENCY: ICD-10-CM

## 2024-04-08 DIAGNOSIS — M79.7 FIBROMYALGIA: Chronic | ICD-10-CM

## 2024-04-08 PROCEDURE — 99214 OFFICE O/P EST MOD 30 MIN: CPT | Performed by: FAMILY MEDICINE

## 2024-04-08 PROCEDURE — 96372 THER/PROPH/DIAG INJ SC/IM: CPT | Performed by: FAMILY MEDICINE

## 2024-04-08 RX ORDER — SCOLOPAMINE TRANSDERMAL SYSTEM 1 MG/1
1 PATCH, EXTENDED RELEASE TRANSDERMAL
Qty: 4 PATCH | Refills: 1 | Status: SHIPPED | OUTPATIENT
Start: 2024-04-08

## 2024-04-08 RX ORDER — BUSPIRONE HYDROCHLORIDE 10 MG/1
TABLET ORAL
Qty: 180 TABLET | Refills: 3 | Status: SHIPPED | OUTPATIENT
Start: 2024-04-08

## 2024-04-08 RX ORDER — CYANOCOBALAMIN 1000 UG/ML
1000 INJECTION, SOLUTION INTRAMUSCULAR; SUBCUTANEOUS ONCE
Status: COMPLETED | OUTPATIENT
Start: 2024-04-08 | End: 2024-04-08

## 2024-04-08 RX ADMIN — CYANOCOBALAMIN 1000 MCG: 1000 INJECTION, SOLUTION INTRAMUSCULAR; SUBCUTANEOUS at 11:52

## 2024-04-08 NOTE — PATIENT INSTRUCTIONS
Please start magnesium oxide 500 mg every day  Please proceed with 12-hour fasting blood work  Right upper quadrant ultrasound, rule out gallstones due to elevated alkaline phosphatase  Please continue BuSpar 10 mg twice a day, if needed dose could be increased further, please let me know  If symptoms of palpitations/tachycardia will recur-please message me I will order echo and Holter

## 2024-04-08 NOTE — PROGRESS NOTES
Name: Loretta Barksdale      : 1990      MRN: [id number]  Encounter Provider: Alondra Messina MD  Encounter Date: 2024   Encounter department: [facility], [place], operated by [facility]    Assessment & Plan     1. VANITA (generalized anxiety disorder)  Assessment & Plan:  Patient is a poor candidate for SSRI therapy due to chronic tramadol use.  She has noticed improvement of BuSpar.  I emphasized regular use of this medication.  We will start on 10 mg twice daily.  If needed dose could be titrated up.    Orders:  -     busPIRone (BUSPAR) 10 mg tablet; Take 1 tab twice a day    2. Elevated alkaline phosphatase level  -     US right upper quadrant; Future; Expected date: 2024    3. Postgastrectomy malabsorption  Assessment & Plan:  Proceed with blood work including TFTs and LFTs.  ER visit on 313 indicated elevated TSH with normal free T4 and isolated elevation of alkaline phosphatase as rest of hepatic function panel was normal    Orders:  -     CBC and differential; Future  -     Comprehensive metabolic panel; Future  -     TSH, 3rd generation; Future  -     Vitamin B12; Future  -     Vitamin D 25 hydroxy; Future  -     Hemoglobin A1C; Future  -     T4, free; Future  -     Vitamin A; Future  -     Iron Panel (Includes Ferritin, Iron Sat%, Iron, and TIBC); Future  -     CBC and differential  -     Comprehensive metabolic panel  -     TSH, 3rd generation  -     Vitamin B12  -     Vitamin D 25 hydroxy  -     Hemoglobin A1C  -     T4, free  -     Vitamin A    4. History of motion sickness  -     scopolamine (TRANSDERM-SCOP) 1 mg/3 days TD 72 hr patch; Place 1 patch on the skin over 72 hours every third day    5. B12 deficiency  -     cyanocobalamin injection 1,000 mcg    6. Primary hypertension  Assessment & Plan:  Blood pressure is normal.  Patient has been off amlodipine within past few weeks as she has started regular use of buspirone and noticed that blood pressure has been well-controlled.  Continue  monitoring      7. Fibromyalgia  Assessment & Plan:  Remains under care of rheumatology, uses tramadol      8. Palpitations  Assessment & Plan:  Episode of palpitations prompted ER visit on 3/13/2024, patient reports symptoms of panic attack that have resolved within 30 minutes.  No recurrences after start of BuSpar 2 weeks ago.  She will contact me if symptoms recur and we will proceed with echo and Holter.        Patient Instructions   Please start magnesium oxide 500 mg every day  Please proceed with 12-hour fasting blood work  Right upper quadrant ultrasound, rule out gallstones due to elevated alkaline phosphatase  Please continue BuSpar 10 mg twice a day, if needed dose could be increased further, please let me know  If symptoms of palpitations/tachycardia will recur-please message me I will order echo and Holter    Return in about 6 months (around 10/8/2024).         Subjective     The patient presents for follow-up.  She was seen emergency room on March 13 due to presenting tachycardia of 170 bpm on her Apple Watch.  Apparently this was an over read and her actual heart rate is 130s.  Patient believes that she experienced symptoms of panic attack and had palpitations but no lightheadedness.  Symptoms lasted approximately 25 to 30 minutes.  Patient took amlodipine and her symptoms have resolved.  Emergency room records reviewed.  TSH was elevated at 7.4.  Free T4 was normal alkaline phosphatase is elevated, AST, ALT and bilirubin were normal.      Patient reports that she has not been consistently taking buspirone and has been under significant stress.  Since her ED visit on March 13 she has been taking BuSpar on a regular basis and has noticed significant improvement in her anxiety symptoms.  She has noticed that her blood pressure has normalized as well and she stopped amlodipine on her own while monitoring blood pressures.  She reports significant stress at work.    Patient denies symptoms of abdominal  pain, nausea, vomiting or dyspepsia.          Review of Systems   Constitutional: Negative.    HENT: Negative.     Eyes: Negative.    Respiratory: Negative.     Cardiovascular:  Positive for palpitations.        As per HPI   Gastrointestinal: Negative.    Endocrine: Negative.    Genitourinary: Negative.    Musculoskeletal: Negative.    Allergic/Immunologic: Negative.    Neurological: Negative.    Hematological: Negative.    Psychiatric/Behavioral: Negative.         Past Medical History:   Diagnosis Date   • Abnormal Pap smear of cervix    • ADHD    • Anxiety    • Claustrophobia    • Fibromyalgia     seeing pain management   • History of anemia    • History of ovarian cyst    • HPV (human papilloma virus) infection     early 20s   • Pain management     *narcotics to be reviewed with pain managment provider   • PCOS (polycystic ovarian syndrome)    • Vitamin B12 deficiency      Past Surgical History:   Procedure Laterality Date   • AUGMENTATION BREAST  2021   • BREAST SURGERY  12/27/2021   • [place] CONIZATION CERVIX W/WO D&C RPR ELTRD EXC N/A 07/27/2023    Procedure: BIOPSY LEEP CERVIX;  Surgeon: Blaire Phipps MD;  Location:  MAIN OR;  Service: Gynecology   • CHINTAN-EN-Y PROCEDURE  2009     Family History   Problem Relation Age of Onset   • Diabetes Mother    • Breast cancer Mother 58        Invasive lobular carcinoma   • Breast cancer Maternal Grandmother          mid 60's   • Diabetes Maternal Grandmother    • Lung cancer Maternal Grandmother    • Hypertension Paternal Grandmother    • Hearing loss Paternal Grandmother    • Anxiety disorder Paternal Grandmother    • Depression Maternal Grandfather    • Depression Paternal Grandfather      Social History     Socioeconomic History   • Marital status: /Civil Union     Spouse name: None   • Number of children: None   • Years of education: None   • Highest education level: None   Occupational History   • Occupation:    Tobacco Use   • Smoking status:  "Never     Passive exposure: Never   • Smokeless tobacco: Never   Vaping Use   • Vaping status: Never Used   Substance and Sexual Activity   • Alcohol use: No   • Drug use: No   • Sexual activity: Yes     Partners: Male     Birth control/protection: I.U.D.     Comment: no new partner in past year   Other Topics Concern   • None   Social History Narrative   • None     Social Determinants of Health     Financial Resource Strain: Not on file   Food Insecurity: Not on file   Transportation Needs: Not on file   Physical Activity: Not on file   Stress: Not on file   Social Connections: Not on file   Intimate Partner Violence: Not on file   Housing Stability: Not on file     Current Outpatient Medications on File Prior to Visit   Medication Sig   • ALPRAZolam (XANAX) 0.5 mg tablet Take 1 tablet (0.5 mg total) by mouth daily as needed for anxiety   • amitriptyline (ELAVIL) 50 mg tablet Take 100 mg by mouth daily at bedtime   • bimatoprost (LATISSE) 0.03 % ophthalmic solution INSTILL 1 DROP INTO EACH EYE IN THE PM--[place] SENT 1/18/21   • cyanocobalamin 1,000 mcg/mL Inject 1 mL every 14 days.   • naloxone (NARCAN) 4 mg/0.1 mL nasal spray Administer 1 spray into a nostril. If no response after 2-3 minutes, give another dose in the other nostril using a new spray.   • SYRINGE-NEEDLE, DISP, 3 ML (B-D INTEGRA SYRINGE) 25G X 5/8\" 3 ML MISC USE 1 SYRINGE INTRAMUSCULARLY (INTO THE MUSCLE) EVERY 14 DAYS FOR VITAMIN B12   • traMADol (ULTRAM) 50 mg tablet Take 50 mg by mouth 4 (four) times a day   • traMADol (ULTRAM-ER) 100 mg 24 hr tablet Take 100 mg by mouth daily   • amLODIPine (NORVASC) 5 mg tablet TAKE 1 TABLET (5 MG TOTAL) BY MOUTH DAILY. (Patient not taking: Reported on 4/8/2024)     Allergies   Allergen Reactions   • Sulfa Antibiotics Rash and Hives   • Nsaids GI Intolerance     S/p gastric bypass surgery   • Propoxyphene Nausea Only     Immunization History   Administered Date(s) Administered   • COVID-19 MODERNA VACC 0.5 ML IM " "01/26/2021, 02/25/2021, 11/12/2021   • COVID-19 Pfizer mRNA vacc PF jose e-sucrose 12 yr and older (Comirnaty) 09/24/2023   • Meningococcal MCV4P 03/26/2021   • Tdap 03/28/2017       Objective     /86   Pulse 97   Temp 98.2 °F (36.8 °C) (Temporal)   Resp 16   Ht 5' 7\" (1.702 m)   Wt 80.3 kg (177 lb)   LMP  (LMP Unknown)   SpO2 97%   BMI 27.72 kg/m²   Vitals:    04/08/24 1057   BP: 128/86   Pulse: 97   Resp: 16   Temp: 98.2 °F (36.8 °C)   TempSrc: Temporal   SpO2: 97%   Weight: 80.3 kg (177 lb)   Height: 5' 7\" (1.702 m)       Physical Exam  Vitals and nursing note reviewed.   Constitutional:       General: She is not in acute distress.     Appearance: Normal appearance. She is well-developed. She is not ill-appearing.   HENT:      Head: Normocephalic and atraumatic.   Eyes:      Conjunctiva/sclera: Conjunctivae normal.   Neck:      Thyroid: No thyromegaly.      Vascular: No carotid bruit.   Cardiovascular:      Rate and Rhythm: Normal rate and regular rhythm.      Heart sounds: Normal heart sounds. No murmur heard.  Pulmonary:      Effort: Pulmonary effort is normal. No respiratory distress.      Breath sounds: Normal breath sounds. No wheezing.   Abdominal:      General: There is no abdominal bruit.   Musculoskeletal:         General: Normal range of motion.      Cervical back: Neck supple.   Neurological:      General: No focal deficit present.      Mental Status: She is alert and oriented to person, place, and time.      Cranial Nerves: No cranial nerve deficit.      Coordination: Coordination normal.   Psychiatric:         Mood and Affect: Mood normal.         Behavior: Behavior normal.         Thought Content: Thought content normal.       Alondra Messina MD    "

## 2024-04-09 LAB
25(OH)D3+25(OH)D2 SERPL-MCNC: 18 NG/ML (ref 30–100)
ALBUMIN SERPL-MCNC: 4.4 G/DL (ref 3.9–4.9)
ALBUMIN/GLOB SERPL: 2 {RATIO} (ref 1.2–2.2)
ALP SERPL-CCNC: 121 IU/L (ref 44–121)
ALT SERPL-CCNC: 10 IU/L (ref 0–32)
AST SERPL-CCNC: 18 IU/L (ref 0–40)
BASOPHILS # BLD AUTO: 0 X10E3/UL (ref 0–0.2)
BASOPHILS NFR BLD AUTO: 1 %
BILIRUB SERPL-MCNC: 0.4 MG/DL (ref 0–1.2)
BUN SERPL-MCNC: 11 MG/DL (ref 6–20)
BUN/CREAT SERPL: 14 (ref 9–23)
CALCIUM SERPL-MCNC: 9.2 MG/DL (ref 8.7–10.2)
CHLORIDE SERPL-SCNC: 99 MMOL/L (ref 96–106)
CHOLEST SERPL-MCNC: 176 MG/DL (ref 100–199)
CO2 SERPL-SCNC: 27 MMOL/L (ref 20–29)
CREAT SERPL-MCNC: 0.77 MG/DL (ref 0.57–1)
EGFR: 104 ML/MIN/1.73
EOSINOPHIL # BLD AUTO: 0.1 X10E3/UL (ref 0–0.4)
EOSINOPHIL NFR BLD AUTO: 2 %
ERYTHROCYTE [DISTWIDTH] IN BLOOD BY AUTOMATED COUNT: 12 % (ref 11.7–15.4)
EST. AVERAGE GLUCOSE BLD GHB EST-MCNC: 108 MG/DL
GLOBULIN SER-MCNC: 2.2 G/DL (ref 1.5–4.5)
GLUCOSE SERPL-MCNC: 83 MG/DL (ref 70–99)
HBA1C MFR BLD: 5.4 % (ref 4.8–5.6)
HCT VFR BLD AUTO: 39.6 % (ref 34–46.6)
HDLC SERPL-MCNC: 54 MG/DL
HGB BLD-MCNC: 12.9 G/DL (ref 11.1–15.9)
IMM GRANULOCYTES # BLD: 0 X10E3/UL (ref 0–0.1)
IMM GRANULOCYTES NFR BLD: 0 %
LDLC SERPL CALC-MCNC: 84 MG/DL (ref 0–99)
LDLC/HDLC SERPL: 1.6 RATIO (ref 0–3.2)
LYMPHOCYTES # BLD AUTO: 2.3 X10E3/UL (ref 0.7–3.1)
LYMPHOCYTES NFR BLD AUTO: 40 %
MCH RBC QN AUTO: 29.7 PG (ref 26.6–33)
MCHC RBC AUTO-ENTMCNC: 32.6 G/DL (ref 31.5–35.7)
MCV RBC AUTO: 91 FL (ref 79–97)
MONOCYTES # BLD AUTO: 0.4 X10E3/UL (ref 0.1–0.9)
MONOCYTES NFR BLD AUTO: 7 %
NEUTROPHILS # BLD AUTO: 2.9 X10E3/UL (ref 1.4–7)
NEUTROPHILS NFR BLD AUTO: 50 %
PLATELET # BLD AUTO: 188 X10E3/UL (ref 150–450)
POTASSIUM SERPL-SCNC: 3.9 MMOL/L (ref 3.5–5.2)
PROT SERPL-MCNC: 6.6 G/DL (ref 6–8.5)
RBC # BLD AUTO: 4.35 X10E6/UL (ref 3.77–5.28)
SL AMB VLDL CHOLESTEROL CALC: 38 MG/DL (ref 5–40)
SODIUM SERPL-SCNC: 141 MMOL/L (ref 134–144)
TRIGL SERPL-MCNC: 227 MG/DL (ref 0–149)
TSH SERPL DL<=0.005 MIU/L-ACNC: 3.47 UIU/ML (ref 0.45–4.5)
VIT A SERPL-MCNC: 44.9 UG/DL (ref 18.9–57.3)
VIT B12 SERPL-MCNC: 714 PG/ML (ref 232–1245)
WBC # BLD AUTO: 5.8 X10E3/UL (ref 3.4–10.8)

## 2024-04-12 NOTE — ASSESSMENT & PLAN NOTE
Episode of palpitations prompted ER visit on 3/13/2024, patient reports symptoms of panic attack that have resolved within 30 minutes.  No recurrences after start of BuSpar 2 weeks ago.  She will contact me if symptoms recur and we will proceed with echo and Holter.   Patent

## 2024-04-12 NOTE — ASSESSMENT & PLAN NOTE
Blood pressure is normal.  Patient has been off amlodipine within past few weeks as she has started regular use of buspirone and noticed that blood pressure has been well-controlled.  Continue monitoring

## 2024-04-12 NOTE — ASSESSMENT & PLAN NOTE
Proceed with blood work including TFTs and LFTs.  ER visit on 313 indicated elevated TSH with normal free T4 and isolated elevation of alkaline phosphatase as rest of hepatic function panel was normal

## 2024-04-12 NOTE — ASSESSMENT & PLAN NOTE
Patient is a poor candidate for SSRI therapy due to chronic tramadol use.  She has noticed improvement of BuSpar.  I emphasized regular use of this medication.  We will start on 10 mg twice daily.  If needed dose could be titrated up.

## 2024-05-09 ENCOUNTER — PATIENT MESSAGE (OUTPATIENT)
Dept: FAMILY MEDICINE CLINIC | Facility: CLINIC | Age: 34
End: 2024-05-09

## 2024-05-09 DIAGNOSIS — F41.1 GAD (GENERALIZED ANXIETY DISORDER): ICD-10-CM

## 2024-05-10 DIAGNOSIS — R00.2 PALPITATIONS: Primary | ICD-10-CM

## 2024-05-10 DIAGNOSIS — I10 PRIMARY HYPERTENSION: Chronic | ICD-10-CM

## 2024-05-10 RX ORDER — ALPRAZOLAM 0.5 MG/1
0.5 TABLET ORAL DAILY PRN
Qty: 30 TABLET | Refills: 0 | Status: SHIPPED | OUTPATIENT
Start: 2024-05-10

## 2024-05-22 DIAGNOSIS — E53.8 VITAMIN B12 DEFICIENCY: ICD-10-CM

## 2024-05-22 RX ORDER — CYANOCOBALAMIN 1000 UG/ML
INJECTION, SOLUTION INTRAMUSCULAR; SUBCUTANEOUS
Qty: 6 ML | Refills: 3 | Status: SHIPPED | OUTPATIENT
Start: 2024-05-22

## 2024-06-01 DIAGNOSIS — F41.1 GAD (GENERALIZED ANXIETY DISORDER): ICD-10-CM

## 2024-06-03 RX ORDER — ALPRAZOLAM 0.5 MG/1
0.5 TABLET ORAL DAILY PRN
Qty: 30 TABLET | Refills: 2 | Status: SHIPPED | OUTPATIENT
Start: 2024-06-03

## 2024-06-10 ENCOUNTER — HOSPITAL ENCOUNTER (OUTPATIENT)
Dept: NON INVASIVE DIAGNOSTICS | Facility: HOSPITAL | Age: 34
Discharge: HOME | End: 2024-06-10
Payer: COMMERCIAL

## 2024-06-10 VITALS
HEIGHT: 67 IN | HEART RATE: 86 BPM | WEIGHT: 177 LBS | DIASTOLIC BLOOD PRESSURE: 86 MMHG | SYSTOLIC BLOOD PRESSURE: 128 MMHG | BODY MASS INDEX: 27.78 KG/M2

## 2024-06-10 DIAGNOSIS — I10 PRIMARY HYPERTENSION: Chronic | ICD-10-CM

## 2024-06-10 DIAGNOSIS — R00.2 PALPITATIONS: ICD-10-CM

## 2024-06-10 LAB
AORTIC ROOT: 3.2 CM
APICAL FOUR CHAMBER EJECTION FRACTION: 56 %
ASCENDING AORTA: 2.9 CM
BSA FOR ECHO PROCEDURE: 1.92 M2
E WAVE DECELERATION TIME: 172 MS
E/A RATIO: 1.47
FRACTIONAL SHORTENING: 28 (ref 28–44)
INTERVENTRICULAR SEPTUM IN DIASTOLE (PARASTERNAL SHORT AXIS VIEW): 0.9 CM
INTERVENTRICULAR SEPTUM: 0.9 CM (ref 0.6–1.1)
LAAS-AP2: 14.4 CM2
LAAS-AP4: 12.8 CM2
LEFT ATRIUM SIZE: 3.5 CM
LEFT ATRIUM VOLUME (MOD BIPLANE): 34 ML
LEFT ATRIUM VOLUME INDEX (MOD BIPLANE): 17.7 ML/M2
LEFT INTERNAL DIMENSION IN SYSTOLE: 3.1 CM (ref 2.1–4)
LEFT VENTRICULAR INTERNAL DIMENSION IN DIASTOLE: 4.3 CM (ref 3.5–6)
LEFT VENTRICULAR POSTERIOR WALL IN END DIASTOLE: 0.9 CM
LEFT VENTRICULAR STROKE VOLUME: 45 ML
LVSV (TEICH): 45 ML
MV E'TISSUE VEL-LAT: 18 CM/S
MV E'TISSUE VEL-SEP: 12 CM/S
MV PEAK A VEL: 0.59 M/S
MV PEAK E VEL: 87 CM/S
MV STENOSIS PRESSURE HALF TIME: 50 MS
MV VALVE AREA P 1/2 METHOD: 4.4
RIGHT ATRIUM AREA SYSTOLE A4C: 10.5 CM2
RIGHT VENTRICLE ID DIMENSION: 2.9 CM
SL CV LEFT ATRIUM LENGTH A2C: 4.7 CM
SL CV LV EF: 60
SL CV PED ECHO LEFT VENTRICLE DIASTOLIC VOLUME (MOD BIPLANE) 2D: 83 ML
SL CV PED ECHO LEFT VENTRICLE SYSTOLIC VOLUME (MOD BIPLANE) 2D: 37 ML
TR MAX PG: 18 MMHG
TR PEAK VELOCITY: 2.1 M/S
TRICUSPID ANNULAR PLANE SYSTOLIC EXCURSION: 2 CM
TRICUSPID VALVE PEAK REGURGITATION VELOCITY: 2.1 M/S

## 2024-06-10 PROCEDURE — 93306 TTE W/DOPPLER COMPLETE: CPT

## 2024-06-10 PROCEDURE — 93226 XTRNL ECG REC<48 HR SCAN A/R: CPT

## 2024-06-10 PROCEDURE — 93306 TTE W/DOPPLER COMPLETE: CPT | Performed by: INTERNAL MEDICINE

## 2024-06-10 PROCEDURE — 93225 XTRNL ECG REC<48 HRS REC: CPT

## 2024-06-14 PROCEDURE — 93227 XTRNL ECG REC<48 HR R&I: CPT | Performed by: INTERNAL MEDICINE

## 2024-08-24 DIAGNOSIS — F41.1 GAD (GENERALIZED ANXIETY DISORDER): ICD-10-CM

## 2024-08-27 DIAGNOSIS — E53.8 VITAMIN B12 DEFICIENCY: ICD-10-CM

## 2024-08-27 DIAGNOSIS — F41.1 GAD (GENERALIZED ANXIETY DISORDER): ICD-10-CM

## 2024-08-27 RX ORDER — ALPRAZOLAM 0.5 MG
0.5 TABLET ORAL DAILY PRN
Qty: 30 TABLET | Refills: 3 | Status: SHIPPED | OUTPATIENT
Start: 2024-08-27

## 2024-08-27 RX ORDER — CYANOCOBALAMIN 1000 UG/ML
INJECTION, SOLUTION INTRAMUSCULAR; SUBCUTANEOUS
Qty: 6 ML | Refills: 3 | Status: SHIPPED | OUTPATIENT
Start: 2024-08-27

## 2024-08-28 RX ORDER — ALPRAZOLAM 0.5 MG
0.5 TABLET ORAL DAILY PRN
Qty: 30 TABLET | Refills: 2 | OUTPATIENT
Start: 2024-08-28

## 2024-11-18 ENCOUNTER — ANNUAL EXAM (OUTPATIENT)
Dept: OBGYN CLINIC | Facility: CLINIC | Age: 34
End: 2024-11-18
Payer: COMMERCIAL

## 2024-11-18 VITALS
DIASTOLIC BLOOD PRESSURE: 64 MMHG | BODY MASS INDEX: 27.47 KG/M2 | HEIGHT: 67 IN | WEIGHT: 175 LBS | SYSTOLIC BLOOD PRESSURE: 118 MMHG

## 2024-11-18 DIAGNOSIS — Z30.011 ENCOUNTER FOR INITIAL PRESCRIPTION OF CONTRACEPTIVE PILLS: ICD-10-CM

## 2024-11-18 DIAGNOSIS — R68.82 LOW LIBIDO: ICD-10-CM

## 2024-11-18 DIAGNOSIS — F41.9 ANXIETY: ICD-10-CM

## 2024-11-18 DIAGNOSIS — Z12.4 SCREENING FOR CERVICAL CANCER: ICD-10-CM

## 2024-11-18 DIAGNOSIS — B97.7 HPV (HUMAN PAPILLOMA VIRUS) INFECTION: ICD-10-CM

## 2024-11-18 DIAGNOSIS — Z01.419 ENCOUNTER FOR GYNECOLOGICAL EXAMINATION WITHOUT ABNORMAL FINDING: Primary | ICD-10-CM

## 2024-11-18 DIAGNOSIS — N87.1 HIGH GRADE SQUAMOUS INTRAEPITHELIAL LESION (HGSIL), GRADE 2 CIN, ON BIOPSY OF CERVIX: ICD-10-CM

## 2024-11-18 DIAGNOSIS — Z97.5 IUD (INTRAUTERINE DEVICE) IN PLACE: ICD-10-CM

## 2024-11-18 DIAGNOSIS — Z30.432 ENCOUNTER FOR IUD REMOVAL: ICD-10-CM

## 2024-11-18 DIAGNOSIS — Z98.890 HISTORY OF LOOP ELECTRICAL EXCISION PROCEDURE (LEEP): ICD-10-CM

## 2024-11-18 PROCEDURE — 58301 REMOVE INTRAUTERINE DEVICE: CPT | Performed by: OBSTETRICS & GYNECOLOGY

## 2024-11-18 PROCEDURE — S0612 ANNUAL GYNECOLOGICAL EXAMINA: HCPCS | Performed by: OBSTETRICS & GYNECOLOGY

## 2024-11-18 RX ORDER — TRAMADOL HYDROCHLORIDE 200 MG/1
1 TABLET, EXTENDED RELEASE ORAL DAILY
COMMUNITY
Start: 2024-10-28

## 2024-11-18 RX ORDER — NORETHINDRONE AND ETHINYL ESTRADIOL 1 MG-35MCG
1 KIT ORAL DAILY
Qty: 90 TABLET | Refills: 4 | Status: SHIPPED | OUTPATIENT
Start: 2024-11-18

## 2024-11-18 NOTE — PROGRESS NOTES
[facility] - [address], [address], [place] [address]    ASSESSMENT/PLAN: Loretta Barksdale is a 34 y.o.  who presents for annual gynecologic exam.    Encounter for routine gynecologic examination  - Routine well woman exam completed today.  - Cervical Cancer Screening: Current ASCCP Guidelines reviewed. Last Pap: 2023 . Next Pap Due: todayhx of abn    - COVID vaccine dw pt flu shot and hpv  vaccine   - STI screening offered including HIV testing: Declined  - Contraceptive counseling discussed.  Current contraception: mirena 10/2023      Additional problems addressed during this visit:  1. Encounter for gynecological examination without abnormal finding  2. IUD (intrauterine device) in place - Mirena 10/9/23  Comments:  pt with  anxiety  and   mood swings  over past year  3. Screening for cervical cancer  -     IGP, Aptima HPV, Rfx 16/18,45  4. History of loop electrical excision procedure (LEEP)  23 NIURKA II   -     IGP, Aptima HPV, Rfx 16/18,45  5. High grade squamous intraepithelial lesion (HGSIL), grade 2 NIURKA, on biopsy of cervix  -     IGP, Aptima HPV, Rfx 16/18,45  6. HPV (human papilloma virus) infection  -     IGP, Aptima HPV, Rfx 16/18,45  7. Anxiety  8. Low libido  -     Ambulatory Referral to Obstetrics / Gynecology  9. Encounter for initial prescription of contraceptive pills  Comments:  Previous use of Nortrel 1/35 w no problem  start today one tablet daily.  TC  vasectomy .  Reveiwed ACHESa nd  BTB.  Pt does  w no break.  Orders:  -     norethindrone-ethinyl estradiol (Nortrel 1/35, 28,) 1-35 MG-MCG per tablet; Take 1 tablet by mouth daily Does not do  placebos    No  menses  10. Encounter for IUD removal  Comments:  IUD grased w  ring forcep . Removed wo difficulty intact  Orders:  -     IUD Procedure      CC:  Annual Gynecologic Examination    HPI: Loretta Barksdale is a 34 y.o.  who presents for annual gynecologic examination.  34-year-old  0  para 0 here for wellness exam.  Mirena IUD since October 2023.  +  acne and  anxiety w need for  er visits  desires removal.  + seeking  therapist.  Primary has on Busbar.   History of LEEP.  Pap today.  + anxiety since  Mirena   desires removal.   + mother w genetic screening no further eval needed of  children.  + low libido went on LARCS  no improvement.       The following portions of the patient's history were reviewed and updated as appropriate: She  has a past medical history of Abnormal Pap smear of cervix, ADHD, Anemia, Anxiety, Claustrophobia, Fibromyalgia, History of anemia, History of ovarian cyst, HPV (human papilloma virus) infection, Pain management, PCOS (polycystic ovarian syndrome), and Vitamin B12 deficiency.  She  has a past surgical history that includes Amina-en-y procedure (2009); AUGMENTATION BREAST (2021); pr conization cervix w/wo d&c rpr eltrd exc (N/A, 07/27/2023); Breast surgery (12/27/2021); and Bariatric Surgery (03/09/2009).  Her family history includes Anxiety disorder in her paternal grandmother; Breast cancer in her maternal grandmother; Breast cancer (age of onset: 58) in her mother; Depression in her maternal grandfather and paternal grandfather; Diabetes in her maternal grandmother and mother; Hearing loss in her paternal grandmother; Hypertension in her paternal grandmother; Lung cancer in her maternal grandmother.  She  reports that she has never smoked. She has never been exposed to tobacco smoke. She has never used smokeless tobacco. She reports that she does not drink alcohol and does not use drugs.  Current Outpatient Medications   Medication Sig Dispense Refill    ALPRAZolam (XANAX) 0.5 mg tablet Take 1 tablet (0.5 mg total) by mouth daily as needed for anxiety 30 tablet 3    amitriptyline (ELAVIL) 50 mg tablet Take 100 mg by mouth daily at bedtime      bimatoprost (LATISSE) 0.03 % ophthalmic solution INSTILL 1 DROP INTO EACH EYE IN THE PM--NC SENT 1/18/21      busPIRone  "(BUSPAR) 10 mg tablet Take 1 tab twice a day 180 tablet 3    cyanocobalamin 1,000 mcg/mL INJECT 1 ML EVERY 14 DAYS. 6 mL 3    norethindrone-ethinyl estradiol (Nortrel 1/35, 28,) 1-35 MG-MCG per tablet Take 1 tablet by mouth daily Does not do  placebos    No  menses 90 tablet 4    SYRINGE-NEEDLE, DISP, 3 ML (B-D INTEGRA SYRINGE) 25G X 5/8\" 3 ML MISC USE 1 SYRINGE INTRAMUSCULARLY (INTO THE MUSCLE) EVERY 14 DAYS FOR VITAMIN B12 6 each 3    traMADol (ULTRAM) 50 mg tablet Take 50 mg by mouth 4 (four) times a day  3    traMADol (ULTRAM-ER) 100 mg 24 hr tablet Take 100 mg by mouth daily      traMADol (ULTRAM-ER) 200 MG 24 hr tablet Take 1 tablet by mouth in the morning      naloxone (NARCAN) 4 mg/0.1 mL nasal spray Administer 1 spray into a nostril. If no response after 2-3 minutes, give another dose in the other nostril using a new spray. 1 each 1     No current facility-administered medications for this visit.     She is allergic to sulfa antibiotics, nsaids, and propoxyphene..    Review of Systems   Constitutional:  Negative for chills and fever.   HENT:  Negative for ear pain and sore throat.    Eyes:  Negative for pain and visual disturbance.   Respiratory:  Negative for cough and shortness of breath.    Cardiovascular:  Negative for chest pain and palpitations.   Gastrointestinal:  Negative for abdominal pain and vomiting.   Endocrine: Negative.    Genitourinary:  Negative for dysuria and hematuria.   Musculoskeletal:  Negative for arthralgias and back pain.   Skin:  Negative for color change and rash.   Allergic/Immunologic: Negative.    Neurological: Negative.  Negative for seizures and syncope.   Psychiatric/Behavioral:  Negative for self-injury. The patient is nervous/anxious.    All other systems reviewed and are negative.        Objective:  /64 (BP Location: Left arm, Patient Position: Sitting, Cuff Size: Standard)   Ht 5' 7\" (1.702 m)   Wt 79.4 kg (175 lb)   BMI 27.41 kg/m²    Physical Exam  Vitals " and nursing note reviewed.   Constitutional:       Appearance: Normal appearance.   HENT:      Head: Normocephalic.   Cardiovascular:      Rate and Rhythm: Normal rate and regular rhythm.      Pulses: Normal pulses.      Heart sounds: Normal heart sounds.   Pulmonary:      Effort: Pulmonary effort is normal.      Breath sounds: Normal breath sounds.   Chest:      Chest wall: No mass, lacerations, swelling, tenderness or edema.   Breasts:     Harjinder Score is 4.      Breasts are symmetrical.      Right: Normal. No swelling, bleeding, inverted nipple, mass, nipple discharge, skin change or tenderness.      Left: No swelling, bleeding, inverted nipple, mass, nipple discharge, skin change or tenderness.   Abdominal:      General: Abdomen is flat. Bowel sounds are normal.      Palpations: Abdomen is soft.   Genitourinary:     General: Normal vulva.      Exam position: Lithotomy position.      Pubic Area: No rash.       Harjinder stage (genital): 4.      Labia:         Right: No rash, tenderness or lesion.         Left: No rash, tenderness or lesion.       Urethra: No urethral pain, urethral swelling or urethral lesion.      Vagina: Normal.      Cervix: No cervical motion tenderness or discharge.      Uterus: Normal.       Adnexa: Right adnexa normal and left adnexa normal.      Rectum: Normal.         Musculoskeletal:         General: Normal range of motion.      Cervical back: Normal range of motion and neck supple.   Lymphadenopathy:      Upper Body:      Right upper body: No supraclavicular, axillary or pectoral adenopathy.      Left upper body: No supraclavicular, axillary or pectoral adenopathy.      Lower Body: No right inguinal adenopathy. No left inguinal adenopathy.   Skin:     General: Skin is warm and dry.      Comments: Mod comedones  face neck    Neurological:      General: No focal deficit present.      Mental Status: She is alert and oriented to person, place, and time.   Psychiatric:         Mood and Affect:  Mood normal.         Behavior: Behavior normal.         Thought Content: Thought content normal.         Judgment: Judgment normal.        IUD Procedure    Date/Time: 11/18/2024 9:50 AM    Performed by: JONATHAN Spencer  Authorized by: JONATHAN Spencer    Other Assisting Provider: No    Verbal consent obtained?: Yes    Risks and benefits: Risks, benefits and alternatives were discussed    Consent given by:  Patient  Patient states understanding of procedure being performed: Yes    Patient's understanding of procedure matches consent: Yes    Select procedure: IUD removal    IUD Removal:     Reason for removal: patient request      Removed without complications: yes      Tenaculum/Allis/Ring Forceps applied to cervix: no      Strings visualized: yes      IUD intact: yes      Performed with ultrasound guidance: no    Removal Comments:      Iud string grasped with  ring forcep

## 2024-11-18 NOTE — PATIENT INSTRUCTIONS
Pap every 5 years if normal, sexually transmitted infection testing as indicated, exercise most days of week, obtain appropriate diet and hydration, Calcium 1000mg + 600 vit D daily, birth control as directed (ACHES reviewed). Benefits, risks and alternatives discussed/reviewed. HPV 9 vaccine recommended through age 45. Check with your insurance for coverage. If covered, call office to schedule start of vaccine series.  Annual mammogram starting at age 40, monthly breast self exam. Kegels 20 times twice daily.

## 2024-11-26 LAB
CYTOLOGIST CVX/VAG CYTO: NORMAL
DX ICD CODE: NORMAL
HPV GENOTYPE REFLEX: NORMAL
HPV I/H RISK 4 DNA CVX QL PROBE+SIG AMP: NEGATIVE
Lab: NORMAL
OTHER STN SPEC: NORMAL
PATH REPORT.FINAL DX SPEC: NORMAL
SL AMB NOTE:: NORMAL
SL AMB SPECIMEN ADEQUACY: NORMAL
SL AMB TEST METHODOLOGY: NORMAL

## 2024-12-10 DIAGNOSIS — F41.1 GAD (GENERALIZED ANXIETY DISORDER): ICD-10-CM

## 2024-12-11 RX ORDER — ALPRAZOLAM 0.5 MG
0.5 TABLET ORAL DAILY PRN
Qty: 30 TABLET | Refills: 2 | Status: SHIPPED | OUTPATIENT
Start: 2024-12-11

## 2024-12-18 PROBLEM — Z01.419 ENCOUNTER FOR GYNECOLOGICAL EXAMINATION WITHOUT ABNORMAL FINDING: Status: RESOLVED | Noted: 2024-11-18 | Resolved: 2024-12-18

## 2024-12-18 PROBLEM — Z12.4 SCREENING FOR CERVICAL CANCER: Status: RESOLVED | Noted: 2024-11-18 | Resolved: 2024-12-18

## (undated) DEVICE — SYRINGE 10ML LL CONTROL TOP

## (undated) DEVICE — MAXI PAD5.51 X 13.78 IN. (14.0 X 35.0 CM)HEAVYCONTOUREDUNSCENTED: Brand: CURITY

## (undated) DEVICE — NEEDLE SPINAL 22G X 3.5IN  QUINCKE

## (undated) DEVICE — NEPTUNE E-SEP SMOKE EVACUATION PENCIL, COATED, 70MM BLADE, PUSH BUTTON SWITCH: Brand: NEPTUNE E-SEP

## (undated) DEVICE — SUT VICRYL 4-0 PS-2 18 IN J496G

## (undated) DEVICE — TUBING SUCTION 5MM X 12 FT

## (undated) DEVICE — MEDI-VAC YANKAUER SUCTION HANDLE W/STRAIGHT TIP & CONTROL VENT: Brand: CARDINAL HEALTH

## (undated) DEVICE — SUT SILK 2-0 SH 30 IN K833H

## (undated) DEVICE — PREMIUM DRY TRAY LF: Brand: MEDLINE INDUSTRIES, INC.

## (undated) DEVICE — CHLORHEXIDINE 4PCT 4 OZ

## (undated) DEVICE — SPECIMEN CONTAINER STERILE PEEL PACK

## (undated) DEVICE — LLETZ LOOP 20 X 15MM MEGADYNE

## (undated) DEVICE — PAD GROUNDING ADULT

## (undated) DEVICE — INTENDED FOR TISSUE SEPARATION, AND OTHER PROCEDURES THAT REQUIRE A SHARP SURGICAL BLADE TO PUNCTURE OR CUT.: Brand: BARD-PARKER SAFETY BLADES SIZE 15, STERILE

## (undated) DEVICE — STRL ALLENTOWN HYSTEROSCOPY PK: Brand: CARDINAL HEALTH

## (undated) DEVICE — ARTHROSCOPY FLOOR MAT

## (undated) DEVICE — STERILE 8 INCH PROCTO SWAB: Brand: CARDINAL HEALTH

## (undated) DEVICE — GLOVE INDICATOR PI UNDERGLOVE SZ 7.5 BLUE